# Patient Record
Sex: FEMALE | Race: BLACK OR AFRICAN AMERICAN | Employment: PART TIME | URBAN - METROPOLITAN AREA
[De-identification: names, ages, dates, MRNs, and addresses within clinical notes are randomized per-mention and may not be internally consistent; named-entity substitution may affect disease eponyms.]

---

## 2017-02-23 ENCOUNTER — OFFICE VISIT (OUTPATIENT)
Dept: FAMILY MEDICINE CLINIC | Age: 22
End: 2017-02-23

## 2017-02-23 VITALS
HEART RATE: 81 BPM | DIASTOLIC BLOOD PRESSURE: 70 MMHG | SYSTOLIC BLOOD PRESSURE: 106 MMHG | WEIGHT: 120 LBS | HEIGHT: 63 IN | TEMPERATURE: 98.1 F | BODY MASS INDEX: 21.26 KG/M2 | RESPIRATION RATE: 16 BRPM | OXYGEN SATURATION: 98 %

## 2017-02-23 DIAGNOSIS — J02.9 SORE THROAT: ICD-10-CM

## 2017-02-23 DIAGNOSIS — Z12.4 PAP SMEAR FOR CERVICAL CANCER SCREENING: Primary | ICD-10-CM

## 2017-02-23 NOTE — PATIENT INSTRUCTIONS
Sore Throat: Care Instructions  Your Care Instructions    Infection by bacteria or a virus causes most sore throats. Cigarette smoke, dry air, air pollution, allergies, and yelling can also cause a sore throat. Sore throats can be painful and annoying. Fortunately, most sore throats go away on their own. If you have a bacterial infection, your doctor may prescribe antibiotics. Follow-up care is a key part of your treatment and safety. Be sure to make and go to all appointments, and call your doctor if you are having problems. It's also a good idea to know your test results and keep a list of the medicines you take. How can you care for yourself at home? · If your doctor prescribed antibiotics, take them as directed. Do not stop taking them just because you feel better. You need to take the full course of antibiotics. · Gargle with warm salt water once an hour to help reduce swelling and relieve discomfort. Use 1 teaspoon of salt mixed in 1 cup of warm water. · Take an over-the-counter pain medicine, such as acetaminophen (Tylenol), ibuprofen (Advil, Motrin), or naproxen (Aleve). Read and follow all instructions on the label. · Be careful when taking over-the-counter cold or flu medicines and Tylenol at the same time. Many of these medicines have acetaminophen, which is Tylenol. Read the labels to make sure that you are not taking more than the recommended dose. Too much acetaminophen (Tylenol) can be harmful. · Drink plenty of fluids. Fluids may help soothe an irritated throat. Hot fluids, such as tea or soup, may help decrease throat pain. · Use over-the-counter throat lozenges to soothe pain. Regular cough drops or hard candy may also help. These should not be given to young children because of the risk of choking. · Do not smoke or allow others to smoke around you. If you need help quitting, talk to your doctor about stop-smoking programs and medicines.  These can increase your chances of quitting for good. · Use a vaporizer or humidifier to add moisture to your bedroom. Follow the directions for cleaning the machine. When should you call for help? Call your doctor now or seek immediate medical care if:  · You have new or worse trouble swallowing. · Your sore throat gets much worse on one side. Watch closely for changes in your health, and be sure to contact your doctor if you do not get better as expected. Where can you learn more? Go to http://faustina-gavin.info/. Enter 062 [id number] 80 [id number] in the search box to learn more about \"Sore Throat: Care Instructions. \"  Current as of: July 29, 2016  Content Version: 11.1  © 4894-8099 [employer] Software, [employer]. Care instructions adapted under license by [employer] (which disclaims liability or warranty for this information). If you have questions about a medical condition or this instruction, always ask your healthcare professional. [address] any warranty or liability for your use of this information.

## 2017-02-23 NOTE — MR AVS SNAPSHOT
Visit Information Date & Time Provider Department Dept. Phone Encounter #  
 2/23/2017  2:20 PM Eddi Olivera, [address] [phone] [id number] Upcoming Health Maintenance Date Due  
 HPV AGE 9Y-34Y (1 of 3 - Female 3 Dose Series) 7/23/2005 DTaP/Tdap/Td series (1 - Tdap) 7/23/2015 INFLUENZA AGE 9 TO ADULT 8/1/2016 PAP AKA CERVICAL CYTOLOGY 10/28/2018 Allergies as of 2/23/2017  Review Complete On: 2/23/2017 By: Eddi Olivera MD  
 No Known Allergies Current Immunizations  Never Reviewed No immunizations on file. Not reviewed this visit You Were Diagnosed With   
  
 Codes Comments Sore throat    -  Primary ICD-10-CM: J02.9 ICD-9-CM: 074 Pap smear for cervical cancer screening     ICD-10-CM: Z12.4 ICD-9-CM: V76.2 Vitals BP  
  
  
  
  
  
 106/70 (BP 1 Location: Left arm, BP Patient Position: Sitting) BMI and BSA Data Body Mass Index Body Surface Area  
 21.26 kg/m 2 1.56 m 2 Preferred Pharmacy Pharmacy Name Phone [facility] [address], [address] Your Updated Medication List  
  
Notice  As of 2/23/2017  3:14 PM  
 You have not been prescribed any medications. We Performed the Following PAP, LB, [id number] To-Do List   
 02/23/2017 Microbiology:  THROAT CULTURE Patient Instructions Sore Throat: Care Instructions Your Care Instructions Infection by bacteria or a virus causes most sore throats. Cigarette smoke, dry air, air pollution, allergies, and yelling can also cause a sore throat. Sore throats can be painful and annoying. Fortunately, most sore throats go away on their own. If you have a bacterial infection, your doctor may prescribe antibiotics. Follow-up care is a key part of your treatment and safety.  Be sure to make and go to all appointments, and call your doctor if you are having problems. It's also a good idea to know your test results and keep a list of the medicines you take. How can you care for yourself at home? · If your doctor prescribed antibiotics, take them as directed. Do not stop taking them just because you feel better. You need to take the full course of antibiotics. · Gargle with warm salt water once an hour to help reduce swelling and relieve discomfort. Use 1 teaspoon of salt mixed in 1 cup of warm water. · Take an over-the-counter pain medicine, such as acetaminophen (Tylenol), ibuprofen (Advil, Motrin), or naproxen (Aleve). Read and follow all instructions on the label. · Be careful when taking over-the-counter cold or flu medicines and Tylenol at the same time. Many of these medicines have acetaminophen, which is Tylenol. Read the labels to make sure that you are not taking more than the recommended dose. Too much acetaminophen (Tylenol) can be harmful. · Drink plenty of fluids. Fluids may help soothe an irritated throat. Hot fluids, such as tea or soup, may help decrease throat pain. · Use over-the-counter throat lozenges to soothe pain. Regular cough drops or hard candy may also help. These should not be given to young children because of the risk of choking. · Do not smoke or allow others to smoke around you. If you need help quitting, talk to your doctor about stop-smoking programs and medicines. These can increase your chances of quitting for good. · Use a vaporizer or humidifier to add moisture to your bedroom. Follow the directions for cleaning the machine. When should you call for help? Call your doctor now or seek immediate medical care if: 
· You have new or worse trouble swallowing. · Your sore throat gets much worse on one side. Watch closely for changes in your health, and be sure to contact your doctor if you do not get better as expected. Where can you learn more? Go to http://faustina-gavin.info/. Enter [id number] in the search box to learn more about \"Sore Throat: Care Instructions. \" Current as of: July 29, 2016 Content Version: 11.1 © 5775-3294 Healthwise, Incorporated. Care instructions adapted under license by [employer] (which disclaims liability or warranty for this information). If you have questions about a medical condition or this instruction, always ask your healthcare professional. [address] any warranty or liability for your use of this information. Introducing [facility] & HEALTH SERVICES! Leon Bland introduces [employer] patient portal. Now you can access parts of your medical record, email your doctor's office, and request medication refills online. 1. In your internet browser, go to [email or web address] 2. Click on the First Time User? Click Here link in the Sign In box. You will see the New Member Sign Up page. 3. Enter your Netero Access Code exactly as it appears below. You will not need to use this code after youve completed the sign-up process. If you do not sign up before the expiration date, you must request a new code. · Netero Access Code: [email or web address] Expires: 5/24/2017  3:14 PM 
 
4. Enter the last four digits of your Social Security Number (xxxx) and Date of Birth (mm/dd/yyyy) as indicated and click Submit. You will be taken to the next sign-up page. 5. Create a Netero ID. This will be your Netero login ID and cannot be changed, so think of one that is secure and easy to remember. 6. Create a Netero password. You can change your password at any time. 7. Enter your Password Reset Question and Answer. This can be used at a later time if you forget your password. 8. Enter your e-mail address. You will receive e-mail notification when new information is available in [address] 9. Click Sign Up. You can now view and download portions of your medical record. 10. Click the Download Summary menu link to download a portable copy of your medical information. If you have questions, please visit the Frequently Asked Questions section of the [employer] website. Remember, [employer] is NOT to be used for urgent needs. For medical emergencies, dial 911. Now available from your iPhone and Android! Please provide this summary of care documentation to your next provider. Your primary care clinician is listed as [address] [facility]. If you have any questions after today's visit, please call [phone].

## 2017-02-23 NOTE — PROGRESS NOTES
1. Have you been to the ER, urgent care clinic since your last visit? Hospitalized since your last visit? No    2. Have you seen or consulted any other health care providers outside of the [address] [place] [address] since your last visit? Include any pap smears or colon screening.  No

## 2017-02-24 NOTE — PROGRESS NOTES
HISTORY OF PRESENT ILLNESS  Александр Chu is a 24 y.o. female. HPI  Patient is here today for evaluation and treatment of: Gyn Exam / Sore Throat    Gyn Exam: her for pap and breast exam. She has just started her menses. Sore Throat: states she has had  Some hoarseness and throat feels sore on the left hand side and it is hard to swallow. She is concerned about possible STI;     She has seen Psychiatry and has been diagnosed with some further psychiatric illnessess. She is not on meds ; She has been advised to follow back up with Psych to get her meds. PMH,  Meds, Allergies, Family History, Social history reviewed      No current outpatient prescriptions on file. Review of Systems   Constitutional: Negative for fever. Respiratory: Positive for cough. Gastrointestinal: Positive for vomiting. Physical Exam  Visit Vitals    /70 (BP 1 Location: Left arm, BP Patient Position: Sitting)    Pulse 81    Temp 98.1 °F (36.7 °C) (Oral)    Resp 16    Ht 5' 3\" (1.6 m)    Wt 120 lb (54.4 kg)    LMP 02/19/2017    SpO2 98%    BMI 21.26 kg/m2     General appearance: alert, cooperative, no distress, appears stated age  Neck: supple, symmetrical, trachea midline, no adenopathy, thyroid: not enlarged, symmetric, no tenderness/mass/nodules, oropharynx is nonerythematous;  Lungs: clear to auscultation bilaterally  Heart: regular rate and rhythm, S1, S2 normal, no murmur, click, rub or gallop  Extremities: extremities normal, atraumatic, no cyanosis or edema  Pelvic exam: VULVA: normal appearing vulva with no masses, tenderness or lesions, VAGINA: normal appearing vagina with normal color and discharge, no lesions, CERVIX: normal appearing cervix without discharge or lesions, UTERUS: uterus is normal size, shape, consistency and nontender, ADNEXA: normal adnexa in size, nontender and no masses. ASSESSMENT and PLAN    ICD-10-CM ICD-9-CM    1. Sore throat J02.9 462 THROAT CULTURE   2.  Pap smear for cervical cancer screening Z12.4 V76.2 PAP, LB, RFX HPV [id number])       As above,    treatment plan as listed below  Orders Placed This Encounter    THROAT CULTURE    PAP, LB, RFX HPV PGNTV([id number])     Pt to see psych  Follow-up Disposition:  Return if symptoms worsen or fail to improve. An After Visit Summary was printed and given to the patient. This has been fully explained to the patient, who indicates understanding.

## 2017-02-27 LAB — BACTERIA SPEC RESP CULT: NORMAL

## 2017-02-28 LAB
CYTOLOGIST CVX/VAG CYTO: NORMAL
DX ICD CODE: NORMAL
LABCORP, 190119: NORMAL
Lab: NORMAL
OTHER STN SPEC: NORMAL
PATH REPORT.FINAL DX SPEC: NORMAL
STAT OF ADQ CVX/VAG CYTO-IMP: NORMAL

## 2017-05-08 ENCOUNTER — TELEPHONE (OUTPATIENT)
Dept: FAMILY MEDICINE CLINIC | Age: 22
End: 2017-05-08

## 2017-05-18 ENCOUNTER — OFFICE VISIT (OUTPATIENT)
Dept: FAMILY MEDICINE CLINIC | Age: 22
End: 2017-05-18

## 2017-05-18 VITALS
DIASTOLIC BLOOD PRESSURE: 64 MMHG | SYSTOLIC BLOOD PRESSURE: 106 MMHG | HEIGHT: 63 IN | TEMPERATURE: 98.2 F | OXYGEN SATURATION: 98 % | RESPIRATION RATE: 16 BRPM | HEART RATE: 96 BPM | BODY MASS INDEX: 20.02 KG/M2 | WEIGHT: 113 LBS

## 2017-05-18 DIAGNOSIS — N89.8 VAGINAL ODOR: Primary | ICD-10-CM

## 2017-05-18 DIAGNOSIS — N92.0 MENORRHAGIA WITH REGULAR CYCLE: ICD-10-CM

## 2017-05-18 DIAGNOSIS — F31.81 BIPOLAR 2 DISORDER (HCC): ICD-10-CM

## 2017-05-18 LAB
HCG URINE, QL. (POC): NEGATIVE
VALID INTERNAL CONTROL?: YES
WET MOUNT POCT, WMPOCT: NORMAL

## 2017-05-18 NOTE — PROGRESS NOTES
1. Have you been to the ER, urgent care clinic since your last visit? Hospitalized since your last visit? Yes Where: [facility]    2. Have you seen or consulted any other health care providers outside of the [address] [place] [address] since your last visit? Include any pap smears or colon screening.  No

## 2017-05-18 NOTE — MR AVS SNAPSHOT
Visit Information Date & Time Provider Department Dept. Phone Encounter #  
 5/18/2017  5:20 PM Stu Ratliff, [address]  Upcoming Health Maintenance Date Due  
 HPV AGE 9Y-34Y (1 of 3 - Female 3 Dose Series) 7/23/2005 DTaP/Tdap/Td series (1 - Tdap) 7/23/2015 INFLUENZA AGE 9 TO ADULT 8/1/2017 PAP AKA CERVICAL CYTOLOGY 2/24/2020 Allergies as of 5/18/2017  Review Complete On: 5/18/2017 By: Funmilayo Rushing LPN No Known Allergies Current Immunizations  Never Reviewed No immunizations on file. Not reviewed this visit You Were Diagnosed With   
  
 Codes Comments Vaginal odor    -  Primary ICD-10-CM: U18.5 ICD-9-CM: 625.8 Bipolar 2 disorder (HCC)     ICD-10-CM: F31.81 
ICD-9-CM: 296.89 Vitals BP Pulse Temp Resp Height(growth percentile) Weight(growth percentile) 106/64 (BP 1 Location: Right arm, BP Patient Position: Sitting) 96 98.2 °F (36.8 °C) (Oral) 16 5' 3\" (1.6 m) 113 lb (51.3 kg) SpO2 BMI OB Status Smoking Status 98% 20.02 kg/m2 Needs review Current Every Day Smoker BMI and BSA Data Body Mass Index Body Surface Area 20.02 kg/m 2 1.51 m 2 Preferred Pharmacy Pharmacy Name Phone [id number] Zhou, [address] [place] [address] Your Updated Medication List  
  
Notice  As of 5/18/2017  6:20 PM  
 You have not been prescribed any medications. We Performed the Following REFERRAL TO PSYCHIATRY [REF91 Custom] Comments:  
 Please evaluate patient for bipolar 1,2 schizophrenia. Referral Information Referral ID Referred By Referred To  
  
 [id number] Mckay Worley MD   
   [address] [place] [address] Rd   
   Zara Zhou, [address] Phone: [phone] Fax: [phone] Visits Status Start Date End Date 1 New Request 5/18/17 5/18/18 If your referral has a status of pending review or denied, additional information will be sent to support the outcome of this decision. Patient Instructions Shot for [employer] Control: Care Instructions Your Care Instructions The shot is used to prevent pregnancy. You get the shot in your upper arm or rear end (buttocks). The shot gives you a dose of the hormone progestin. The shot is often called by its brand name, Depo-Provera. The shot provides birth control for 3 months at a time. You then need another shot. Follow-up care is a key part of your treatment and safety. Be sure to make and go to all appointments, and call your doctor if you are having problems. It's also a good idea to know your test results and keep a list of the medicines you take. How can you care for yourself at home? How do you use the birth control shot? · If you get the shot during the first 5 days of your normal period, use backup birth control, such as a condom, or don't have intercourse for 24 hours. · If you get the shot more than 5 days after your periods starts, use backup birth control or don't have intercourse for 5 days. · Talk to your doctor about a schedule to get the shot. You need the shot every 3 months. If you are late getting it, you'll need backup birth control. What if you miss or are late for a shot? Always read the label for specific instructions, or call your doctor. Here are some basic guidelines: · Use backup birth control, such as a condom, or don't have intercourse. Continue using one of these methods until 5 days after you get the missed or late shot. · If you had intercourse, you can use emergency contraception, such as the morning-after pill (Plan B). You can use emergency contraception for up to 5 days after having had sex, but it works best if you take it right away. What else do you need to know? · The shot has side effects.  Because the shot protects for 3 months, the side effects may last 3 months. ¨ You may have changes in your period and your period may stop. You may also have spotting or bleeding between periods. ¨ You may have mood changes, less interest in sex, or weight gain. · The shot may cause bone loss. Talk to your doctor about this and take steps to prevent bone loss, such as getting enough calcium in your diet and exercising regularly. · Check with your doctor before you use any other medicines, including over-the-counter medicines, vitamins, herbal products, and supplements. Birth control hormones may not work as well to prevent pregnancy when combined with other medicines. · The shot doesn't protect against sexually transmitted infections (STIs), such as herpes or HIV/AIDS. If you're not sure whether your sex partner might have an STI, use a condom to protect against infection. When should you call for help? Call your doctor now or seek immediate medical care if: 
· You have severe belly pain. Watch closely for changes in your health, and be sure to contact your doctor if: 
· You think you may be pregnant. · You have any problems with your birth control. · You feel you may be depressed. · You regularly have spotting. · You think you may have been exposed to or have a sexually transmitted infection. Where can you learn more? Go to http://faustina-gavin.info/. Enter I515 in the search box to learn more about \"Shot for Birth Control: Care Instructions. \" Current as of: May 30, 2016 Content Version: 11.2 © 2263-4360 [employer]. Care instructions adapted under license by [employer] (which disclaims liability or warranty for this information). If you have questions about a medical condition or this instruction, always ask your healthcare professional. [place] [address] any warranty or liability for your use of this information. Introducing [facility] & HEALTH SERVICES! Elizabeth Guzman introduces [employer] patient portal. Now you can access parts of your medical record, email your doctor's office, and request medication refills online. 1. In your internet browser, go to [email or web address] 2. Click on the First Time User? Click Here link in the Sign In box. You will see the New Member Sign Up page. 3. Enter your [employer] Access Code exactly as it appears below. You will not need to use this code after youve completed the sign-up process. If you do not sign up before the expiration date, you must request a new code. · [employer] Access Code: [email or web address] Expires: 5/24/2017  4:14 PM 
 
4. Enter the last four digits of your Social Security Number (xxxx) and Date of Birth (mm/dd/yyyy) as indicated and click Submit. You will be taken to the next sign-up page. 5. Create a [employer] ID. This will be your [employer] login ID and cannot be changed, so think of one that is secure and easy to remember. 6. Create a [employer] password. You can change your password at any time. 7. Enter your Password Reset Question and Answer. This can be used at a later time if you forget your password. 8. Enter your e-mail address. You will receive e-mail notification when new information is available in [address] 9. Click Sign Up. You can now view and download portions of your medical record. 10. Click the Download Summary menu link to download a portable copy of your medical information. If you have questions, please visit the Frequently Asked Questions section of the [employer] website. Remember, [employer] is NOT to be used for urgent needs. For medical emergencies, dial 911. Now available from your iPhone and Android! Please provide this summary of care documentation to your next provider. Your primary care clinician is listed as [address]. If you have any questions after today's visit, please call [phone].

## 2017-05-18 NOTE — PROGRESS NOTES
HISTORY OF PRESENT ILLNESS  Mayur Azar is a 24 y.o. female. HPI  Patient is here today for evaluation and treatment of: Vaginal Odor    Vaginal Odor:   Pt states that early March she started her period and bled until mid April. She had a regular period in January and then in February. Her period stopped a few days ago. She now has a vaginal odor. She does have a vaginal discharge. Thick in consistency and yellow in color. She has not yet been to Psychiatry for follow up on PTSD, bipolar 2    PMH,  Meds, Allergies, Family History, Social history reviewed      No current outpatient prescriptions on file. Review of Systems   Constitutional: Negative for chills and fever. Gastrointestinal: Negative for abdominal pain. Physical Exam   Constitutional: She appears well-developed and well-nourished. No distress. Cardiovascular: Exam reveals no friction rub. Pulmonary/Chest: Breath sounds normal. No respiratory distress. She has no wheezes. She has no rales. Genitourinary: Vagina normal and uterus normal. No vaginal discharge found. Nursing note and vitals reviewed. Urine preg neg  ASSESSMENT and PLAN    ICD-10-CM ICD-9-CM    1. Vaginal odor N89.8 625.8 NUSWAB VAGINITIS PLUS      AMB POC SMEAR, STAIN & INTERPRET, WET MOUNT   2. Bipolar 2 disorder (HCC) F31.81 296.89 REFERRAL TO PSYCHIATRY   3. Menorrhagia with regular cycle N92.0 626.2 AMB POC URINE PREGNANCY TEST, VISUAL COLOR COMPARISON       As above, new  Orders Placed This Encounter    NUSWAB VAGINITIS PLUS    REFERRAL TO PSYCHIATRY    AMB POC URINE PREGNANCY TEST, VISUAL COLOR COMPARISON    AMB POC SMEAR, STAIN & INTERPRET, WET MOUNT     Follow-up Disposition:  Return if symptoms worsen or fail to improve. An After Visit Summary was printed and given to the patient. This has been fully explained to the patient, who indicates understanding.

## 2017-05-18 NOTE — PATIENT INSTRUCTIONS
Shot for [employer] Control: Care Instructions  Your Care Instructions  The shot is used to prevent pregnancy. You get the shot in your upper arm or rear end (buttocks). The shot gives you a dose of the hormone progestin. The shot is often called by its brand name, Depo-Provera. The shot provides birth control for 3 months at a time. You then need another shot. Follow-up care is a key part of your treatment and safety. Be sure to make and go to all appointments, and call your doctor if you are having problems. It's also a good idea to know your test results and keep a list of the medicines you take. How can you care for yourself at home? How do you use the birth control shot? · If you get the shot during the first 5 days of your normal period, use backup birth control, such as a condom, or don't have intercourse for 24 hours. · If you get the shot more than 5 days after your periods starts, use backup birth control or don't have intercourse for 5 days. · Talk to your doctor about a schedule to get the shot. You need the shot every 3 months. If you are late getting it, you'll need backup birth control. What if you miss or are late for a shot? Always read the label for specific instructions, or call your doctor. Here are some basic guidelines:  · Use backup birth control, such as a condom, or don't have intercourse. Continue using one of these methods until 5 days after you get the missed or late shot. · If you had intercourse, you can use emergency contraception, such as the morning-after pill (Plan B). You can use emergency contraception for up to 5 days after having had sex, but it works best if you take it right away. What else do you need to know? · The shot has side effects. Because the shot protects for 3 months, the side effects may last 3 months. ¨ You may have changes in your period and your period may stop. You may also have spotting or bleeding between periods.   ¨ You may have mood changes, less interest in sex, or weight gain. · The shot may cause bone loss. Talk to your doctor about this and take steps to prevent bone loss, such as getting enough calcium in your diet and exercising regularly. · Check with your doctor before you use any other medicines, including over-the-counter medicines, vitamins, herbal products, and supplements. Birth control hormones may not work as well to prevent pregnancy when combined with other medicines. · The shot doesn't protect against sexually transmitted infections (STIs), such as herpes or HIV/AIDS. If you're not sure whether your sex partner might have an STI, use a condom to protect against infection. When should you call for help? Call your doctor now or seek immediate medical care if:  · You have severe belly pain. Watch closely for changes in your health, and be sure to contact your doctor if:  · You think you may be pregnant. · You have any problems with your birth control. · You feel you may be depressed. · You regularly have spotting. · You think you may have been exposed to or have a sexually transmitted infection. Where can you learn more? Go to http://faustina-gavin.info/. Enter K710 in the search box to learn more about \"Shot for Birth Control: Care Instructions. \"  Current as of: May 30, 2016  Content Version: 11.2  © 1915-4468 [employer]. Care instructions adapted under license by [employer] (which disclaims liability or warranty for this information). If you have questions about a medical condition or this instruction, always ask your healthcare professional. [place] [address] any warranty or liability for your use of this information.

## 2017-05-24 LAB
A VAGINAE DNA VAG QL NAA+PROBE: ABNORMAL SCORE
BVAB2 DNA VAG QL NAA+PROBE: ABNORMAL SCORE
C ALBICANS DNA VAG QL NAA+PROBE: NEGATIVE
C GLABRATA DNA VAG QL NAA+PROBE: NEGATIVE
C TRACH RRNA SPEC QL NAA+PROBE: NEGATIVE
MEGA1 DNA VAG QL NAA+PROBE: ABNORMAL SCORE
N GONORRHOEA RRNA SPEC QL NAA+PROBE: NEGATIVE
PLEASE NOTE:, 188601: NORMAL
T VAGINALIS RRNA SPEC QL NAA+PROBE: NEGATIVE

## 2017-05-30 RX ORDER — METRONIDAZOLE 500 MG/1
500 TABLET ORAL 2 TIMES DAILY
Qty: 14 TAB | Refills: 0 | Status: SHIPPED | OUTPATIENT
Start: 2017-05-30 | End: 2017-06-06

## 2017-06-22 ENCOUNTER — OFFICE VISIT (OUTPATIENT)
Dept: FAMILY MEDICINE CLINIC | Age: 22
End: 2017-06-22

## 2017-06-22 VITALS
WEIGHT: 118 LBS | OXYGEN SATURATION: 98 % | TEMPERATURE: 98.1 F | SYSTOLIC BLOOD PRESSURE: 100 MMHG | DIASTOLIC BLOOD PRESSURE: 60 MMHG | BODY MASS INDEX: 20.91 KG/M2 | RESPIRATION RATE: 16 BRPM | HEART RATE: 81 BPM | HEIGHT: 63 IN

## 2017-06-22 DIAGNOSIS — Z30.09 FAMILY PLANNING: Primary | ICD-10-CM

## 2017-06-22 RX ORDER — DEXTROAMPHETAMINE SACCHARATE, AMPHETAMINE ASPARTATE, DEXTROAMPHETAMINE SULFATE AND AMPHETAMINE SULFATE 2.5; 2.5; 2.5; 2.5 MG/1; MG/1; MG/1; MG/1
10 TABLET ORAL
COMMUNITY
End: 2022-07-16

## 2017-06-22 RX ORDER — OXCARBAZEPINE 150 MG/1
150 TABLET, FILM COATED ORAL 3 TIMES DAILY
COMMUNITY
End: 2022-10-12

## 2017-06-22 RX ORDER — TRAZODONE HYDROCHLORIDE 50 MG/1
TABLET ORAL
COMMUNITY
End: 2022-10-12

## 2017-06-22 RX ORDER — MEDROXYPROGESTERONE ACETATE 150 MG/ML
150 INJECTION, SUSPENSION INTRAMUSCULAR ONCE
Qty: 1 ML | Refills: 0 | Status: SHIPPED | OUTPATIENT
Start: 2017-06-22 | End: 2017-10-13 | Stop reason: SDUPTHER

## 2017-06-22 NOTE — MR AVS SNAPSHOT
Visit Information Date & Time Provider Department Dept. Phone Encounter #  
 6/22/2017  2:40 PM Stevenson Wiseman, [address] [id number] Follow-up Instructions Return if symptoms worsen or fail to improve. Upcoming Health Maintenance Date Due  
 HPV AGE 9Y-34Y (1 of 3 - Female 3 Dose Series) 7/23/2005 Pneumococcal 19-64 Medium Risk (1 of 1 - PPSV23) 7/23/2013 DTaP/Tdap/Td series (1 - Tdap) 7/23/2015 INFLUENZA AGE 9 TO ADULT 8/1/2017 PAP AKA CERVICAL CYTOLOGY 2/24/2020 Allergies as of 6/22/2017  Review Complete On: 6/22/2017 By: Stevenson Wiseman MD  
 No Known Allergies Current Immunizations  Never Reviewed No immunizations on file. Not reviewed this visit You Were Diagnosed With   
  
 Codes Comments Family planning    -  Primary ICD-10-CM: Z30.09 
ICD-9-CM: V25.09 Vitals BP Pulse Temp Resp Height(growth percentile) Weight(growth percentile) 100/60 (BP 1 Location: Left arm, BP Patient Position: Sitting) 81 98.1 °F (36.7 °C) (Oral) 16 5' 3\" (1.6 m) 118 lb (53.5 kg) LMP SpO2 BMI OB Status Smoking Status (LMP Unknown) 98% 20.9 kg/m2 Needs review Current Every Day Smoker BMI and BSA Data Body Mass Index Body Surface Area  
 20.9 kg/m 2 1.54 m 2 Preferred Pharmacy Pharmacy Name Phone [id number] [place] [address] Your Updated Medication List  
  
   
This list is accurate as of: 6/22/17  3:15 PM.  Always use your most recent med list.  
  
  
  
  
 ADDERALL 10 mg tablet Generic drug:  dextroamphetamine-amphetamine Take 10 mg by mouth.  
  
 medroxyPROGESTERone 150 mg/mL injection Commonly known as:  DEPO-PROVERA  
1 mL by IntraMUSCular route once for 1 dose. Repeat in 3 months. To be administered by nursing. traZODone 50 mg tablet Commonly known as:  Josef Owens Take  by mouth nightly. TRILEPTAL 150 mg tablet Generic drug:  OXcarbazepine Take 150 mg by mouth three (3) times daily. Prescriptions Sent to Pharmacy Refills  
 medroxyPROGESTERone (DEPO-PROVERA) 150 mg/mL injection 0 Si mL by IntraMUSCular route once for 1 dose. Repeat in 3 months. To be administered by nursing. Class: Normal  
 Pharmacy: [id number]- Shweta Steen, [address] [place] [address] #: [phone] Route: IntraMUSCular Follow-up Instructions Return if symptoms worsen or fail to improve. Patient Instructions Shot for [employer] Control: Care Instructions Your Care Instructions The shot is used to prevent pregnancy. You get the shot in your upper arm or rear end (buttocks). The shot gives you a dose of the hormone progestin. The shot is often called by its brand name, Depo-Provera. The shot provides birth control for 3 months at a time. You then need another shot. Follow-up care is a key part of your treatment and safety. Be sure to make and go to all appointments, and call your doctor if you are having problems. It's also a good idea to know your test results and keep a list of the medicines you take. How can you care for yourself at home? How do you use the birth control shot? · If you get the shot during the first 5 days of your normal period, use backup birth control, such as a condom, or don't have intercourse for 24 hours. · If you get the shot more than 5 days after your periods starts, use backup birth control or don't have intercourse for 5 days. · Talk to your doctor about a schedule to get the shot. You need the shot every 3 months. If you are late getting it, you'll need backup birth control. What if you miss or are late for a shot? Always read the label for specific instructions, or call your doctor. Here are some basic guidelines: · Use backup birth control, such as a condom, or don't have intercourse. Continue using one of these methods until 5 days after you get the missed or late shot. · If you had intercourse, you can use emergency contraception, such as the morning-after pill (Plan B). You can use emergency contraception for up to 5 days after having had sex, but it works best if you take it right away. What else do you need to know? · The shot has side effects. Because the shot protects for 3 months, the side effects may last 3 months. ¨ You may have changes in your period and your period may stop. You may also have spotting or bleeding between periods. ¨ You may have mood changes, less interest in sex, or weight gain. · The shot may cause bone loss. Talk to your doctor about this and take steps to prevent bone loss, such as getting enough calcium in your diet and exercising regularly. · Check with your doctor before you use any other medicines, including over-the-counter medicines, vitamins, herbal products, and supplements. Birth control hormones may not work as well to prevent pregnancy when combined with other medicines. · The shot doesn't protect against sexually transmitted infections (STIs), such as herpes or HIV/AIDS. If you're not sure whether your sex partner might have an STI, use a condom to protect against infection. When should you call for help? Call your doctor now or seek immediate medical care if: 
· You have severe belly pain. Watch closely for changes in your health, and be sure to contact your doctor if: 
· You think you may be pregnant. · You have any problems with your birth control. · You feel you may be depressed. · You regularly have spotting. · You think you may have been exposed to or have a sexually transmitted infection. Where can you learn more? Go to http://faustina-gavin.info/. Enter O405 in the search box to learn more about \"Shot for Birth Control: Care Instructions. \" Current as of: March 16, 2017 Content Version: 11.3 © 7259-6721 [employer] Incorporated. Care instructions adapted under license by [employer] (which disclaims liability or warranty for this information). If you have questions about a medical condition or this instruction, always ask your healthcare professional. [address] any warranty or liability for your use of this information. Introducing [facility]! Neisha Nix introduces [employer]Tree patient portal. Now you can access parts of your medical record, email your doctor's office, and request medication refills online. 1. In your internet browser, go to https://[employer]. [employer] 2. Click on the First Time User? Click Here link in the Sign In box. You will see the New Member Sign Up page. 3. Enter your LogicTree Access Code exactly as it appears below. You will not need to use this code after youve completed the sign-up process. If you do not sign up before the expiration date, you must request a new code. · LogicTree Access Code: [email or web address] Expires: 9/20/2017  3:15 PM 
 
4. Enter the last four digits of your Social Security Number (xxxx) and Date of Birth (mm/dd/yyyy) as indicated and click Submit. You will be taken to the next sign-up page. 5. Create a LogicTree ID. This will be your LogicTree login ID and cannot be changed, so think of one that is secure and easy to remember. 6. Create a LogicTree password. You can change your password at any time. 7. Enter your Password Reset Question and Answer. This can be used at a later time if you forget your password. 8. Enter your e-mail address. You will receive e-mail notification when new information is available in [address] 9. Click Sign Up. You can now view and download portions of your medical record. 10. Click the Download Summary menu link to download a portable copy of your medical information.  
 
If you have questions, please visit the Frequently Asked Questions section of the [employer]. Remember, [employer] is NOT to be used for urgent needs. For medical emergencies, dial 911. Now available from your iPhone and Android! Please provide this summary of care documentation to your next provider. Your primary care clinician is listed as [address]. If you have any questions after today's visit, please call [phone].

## 2017-06-22 NOTE — PROGRESS NOTES
HISTORY OF PRESENT ILLNESS  Radha Teran is a 24 y.o. female. HPI  Patient is here today for evaluation and treatment of: Family Planning   Family  Planning. Pt is interested in depo provera. LMP was last week. She has never been on Depo. She has no new complaints      Current Outpatient Prescriptions:     dextroamphetamine-amphetamine (ADDERALL) 10 mg tablet, Take 10 mg by mouth., Disp: , Rfl:     OXcarbazepine (TRILEPTAL) 150 mg tablet, Take 150 mg by mouth three (3) times daily. , Disp: , Rfl:     traZODone (DESYREL) 50 mg tablet, Take  by mouth nightly., Disp: , Rfl:       Review of Systems   Constitutional: Negative for fever. Cardiovascular: Negative for chest pain and palpitations. PMH,  Meds, Allergies, Family History, Social history reviewed    Physical Exam   Constitutional: She appears well-developed and well-nourished. Cardiovascular: Normal rate and regular rhythm. Exam reveals no gallop and no friction rub. No murmur heard. Pulmonary/Chest: Breath sounds normal. No respiratory distress. She has no wheezes. She has no rales. Musculoskeletal: She exhibits no edema. Nursing note and vitals reviewed. ASSESSMENT and PLAN    ICD-10-CM ICD-9-CM    1. Family planning Z30.09 V25.09        As above, new  Orders Placed This Encounter    dextroamphetamine-amphetamine (ADDERALL) 10 mg tablet    OXcarbazepine (TRILEPTAL) 150 mg tablet    traZODone (DESYREL) 50 mg tablet    medroxyPROGESTERone (DEPO-PROVERA) 150 mg/mL injection     Pt to return to the office within 5 days of starting her next period to have depo administered by nursing  Follow-up Disposition:  Return if symptoms worsen or fail to improve. An After Visit Summary was printed and given to the patient. This has been fully explained to the patient, who indicates understanding.

## 2017-07-25 ENCOUNTER — CLINICAL SUPPORT (OUTPATIENT)
Dept: FAMILY MEDICINE CLINIC | Age: 22
End: 2017-07-25

## 2017-07-25 DIAGNOSIS — Z30.42 ENCOUNTER FOR DEPO-PROVERA CONTRACEPTION: Primary | ICD-10-CM

## 2017-07-25 LAB
HCG URINE, QL. (POC): NEGATIVE
VALID INTERNAL CONTROL?: YES

## 2017-07-25 RX ORDER — METHYLPREDNISOLONE ACETATE 80 MG/ML
80 INJECTION, SUSPENSION INTRA-ARTICULAR; INTRALESIONAL; INTRAMUSCULAR; SOFT TISSUE ONCE
Qty: 1 VIAL | Refills: 0 | Status: SHIPPED | COMMUNITY
Start: 2017-07-25 | End: 2017-07-25

## 2017-07-25 NOTE — PROGRESS NOTES
Patient presented to office today for depo injection. Patient consents to receiving injection. No adverse effects noted to previous injection. Patient is feeling well, denies any issues at this time. Patient pregnancy test: Negative  Patient received 1 ml of depo to Right Gluteus . Patient tolerated procedure well. Advised patient she will need to return to office no sooner than Oct 10, 2017 and no later than Oct 24, 2017 for next injection. Patient verbalized understanding. Patient received depo calendar with date circled. Patient left ambulatory with no complaints of pain or distress noted at this time. Subjective:      Charles Sunshine is here for her depoprovera injection. Patient wishes to continue depoprovera treatment for contraception. Side effects of treatment to date: none. Standing order is on patient's medication list.    Last Depoprovera injection date: This is first time  Last Pap smear date: 2/23/2017    Objective: There were no vitals taken for this visit. Assessment/Plan:     Stable, doing well on Depoprovera, appropriate to continue. Depoprovera 150 mg IM given. She tolerated the injection well, see Immunization activity for details.     Delilah Singh LPN

## 2017-09-08 ENCOUNTER — HOSPITAL ENCOUNTER (EMERGENCY)
Age: 22
Discharge: HOME | End: 2017-09-08
Attending: EMERGENCY MEDICINE
Payer: MEDICAID

## 2017-09-08 VITALS
HEART RATE: 69 BPM | DIASTOLIC BLOOD PRESSURE: 75 MMHG | SYSTOLIC BLOOD PRESSURE: 114 MMHG | WEIGHT: 115 LBS | TEMPERATURE: 98.5 F | OXYGEN SATURATION: 98 % | RESPIRATION RATE: 16 BRPM | BODY MASS INDEX: 20.37 KG/M2

## 2017-09-08 DIAGNOSIS — R10.13 ABDOMINAL PAIN, EPIGASTRIC: Primary | ICD-10-CM

## 2017-09-08 DIAGNOSIS — R11.2 NON-INTRACTABLE VOMITING WITH NAUSEA, UNSPECIFIED VOMITING TYPE: ICD-10-CM

## 2017-09-08 DIAGNOSIS — F14.10 COCAINE ABUSE (HCC): ICD-10-CM

## 2017-09-08 DIAGNOSIS — R19.7 DIARRHEA, UNSPECIFIED TYPE: ICD-10-CM

## 2017-09-08 LAB
ALBUMIN SERPL-MCNC: 3.3 G/DL (ref 3.4–5)
ALBUMIN/GLOB SERPL: 1 {RATIO} (ref 0.8–1.7)
ALP SERPL-CCNC: 49 U/L (ref 45–117)
ALT SERPL-CCNC: 17 U/L (ref 13–56)
AMPHET UR QL SCN: NEGATIVE
ANION GAP SERPL CALC-SCNC: 6 MMOL/L (ref 3–18)
APPEARANCE UR: CLEAR
AST SERPL-CCNC: 19 U/L (ref 15–37)
ATRIAL RATE: 48 BPM
BARBITURATES UR QL SCN: NEGATIVE
BASOPHILS # BLD: 0 K/UL (ref 0–0.1)
BASOPHILS NFR BLD: 0 % (ref 0–2)
BENZODIAZ UR QL: NEGATIVE
BILIRUB DIRECT SERPL-MCNC: 0.2 MG/DL (ref 0–0.2)
BILIRUB SERPL-MCNC: 1.1 MG/DL (ref 0.2–1)
BILIRUB UR QL: NEGATIVE
BUN SERPL-MCNC: 9 MG/DL (ref 7–18)
BUN/CREAT SERPL: 9 (ref 12–20)
CALCIUM SERPL-MCNC: 8 MG/DL (ref 8.5–10.1)
CALCULATED P AXIS, ECG09: 73 DEGREES
CALCULATED R AXIS, ECG10: 63 DEGREES
CALCULATED T AXIS, ECG11: 60 DEGREES
CANNABINOIDS UR QL SCN: POSITIVE
CHLORIDE SERPL-SCNC: 108 MMOL/L (ref 100–108)
CK MB CFR SERPL CALC: 0.5 % (ref 0–4)
CK MB SERPL-MCNC: 1.1 NG/ML (ref 5–25)
CK SERPL-CCNC: 206 U/L (ref 26–192)
CO2 SERPL-SCNC: 26 MMOL/L (ref 21–32)
COCAINE UR QL SCN: POSITIVE
COLOR UR: YELLOW
CREAT SERPL-MCNC: 0.96 MG/DL (ref 0.6–1.3)
DIAGNOSIS, 93000: NORMAL
DIFFERENTIAL METHOD BLD: ABNORMAL
EOSINOPHIL # BLD: 0.3 K/UL (ref 0–0.4)
EOSINOPHIL NFR BLD: 5 % (ref 0–5)
ERYTHROCYTE [DISTWIDTH] IN BLOOD BY AUTOMATED COUNT: 14.9 % (ref 11.6–14.5)
GLOBULIN SER CALC-MCNC: 3.3 G/DL (ref 2–4)
GLUCOSE SERPL-MCNC: 71 MG/DL (ref 74–99)
GLUCOSE UR STRIP.AUTO-MCNC: NEGATIVE MG/DL
HCG UR QL: NEGATIVE
HCT VFR BLD AUTO: 39.4 % (ref 35–45)
HDSCOM,HDSCOM: ABNORMAL
HGB BLD-MCNC: 13.3 G/DL (ref 12–16)
HGB UR QL STRIP: NEGATIVE
KETONES UR QL STRIP.AUTO: NEGATIVE MG/DL
LEUKOCYTE ESTERASE UR QL STRIP.AUTO: NEGATIVE
LIPASE SERPL-CCNC: 114 U/L (ref 73–393)
LYMPHOCYTES # BLD: 2.3 K/UL (ref 0.9–3.6)
LYMPHOCYTES NFR BLD: 39 % (ref 21–52)
MAGNESIUM SERPL-MCNC: 2.6 MG/DL (ref 1.6–2.6)
MCH RBC QN AUTO: 28.2 PG (ref 24–34)
MCHC RBC AUTO-ENTMCNC: 33.8 G/DL (ref 31–37)
MCV RBC AUTO: 83.5 FL (ref 74–97)
METHADONE UR QL: NEGATIVE
MONOCYTES # BLD: 0.5 K/UL (ref 0.05–1.2)
MONOCYTES NFR BLD: 8 % (ref 3–10)
NEUTS SEG # BLD: 2.8 K/UL (ref 1.8–8)
NEUTS SEG NFR BLD: 48 % (ref 40–73)
NITRITE UR QL STRIP.AUTO: NEGATIVE
OPIATES UR QL: NEGATIVE
P-R INTERVAL, ECG05: 158 MS
PCP UR QL: NEGATIVE
PH UR STRIP: 5.5 [PH] (ref 5–8)
PLATELET # BLD AUTO: 282 K/UL (ref 135–420)
PMV BLD AUTO: 10.6 FL (ref 9.2–11.8)
POTASSIUM SERPL-SCNC: 3.9 MMOL/L (ref 3.5–5.5)
PROT SERPL-MCNC: 6.6 G/DL (ref 6.4–8.2)
PROT UR STRIP-MCNC: NEGATIVE MG/DL
Q-T INTERVAL, ECG07: 456 MS
QRS DURATION, ECG06: 82 MS
QTC CALCULATION (BEZET), ECG08: 407 MS
RBC # BLD AUTO: 4.72 M/UL (ref 4.2–5.3)
SODIUM SERPL-SCNC: 140 MMOL/L (ref 136–145)
SP GR UR REFRACTOMETRY: 1.03 (ref 1–1.03)
TROPONIN I SERPL-MCNC: <0.02 NG/ML (ref 0–0.04)
UROBILINOGEN UR QL STRIP.AUTO: 1 EU/DL (ref 0.2–1)
VENTRICULAR RATE, ECG03: 48 BPM
WBC # BLD AUTO: 5.8 K/UL (ref 4.6–13.2)

## 2017-09-08 PROCEDURE — 74011250636 HC RX REV CODE- 250/636: Performed by: EMERGENCY MEDICINE

## 2017-09-08 PROCEDURE — 80076 HEPATIC FUNCTION PANEL: CPT | Performed by: EMERGENCY MEDICINE

## 2017-09-08 PROCEDURE — 80048 BASIC METABOLIC PNL TOTAL CA: CPT | Performed by: EMERGENCY MEDICINE

## 2017-09-08 PROCEDURE — 93005 ELECTROCARDIOGRAM TRACING: CPT

## 2017-09-08 PROCEDURE — 85025 COMPLETE CBC W/AUTO DIFF WBC: CPT | Performed by: EMERGENCY MEDICINE

## 2017-09-08 PROCEDURE — 80307 DRUG TEST PRSMV CHEM ANLYZR: CPT | Performed by: EMERGENCY MEDICINE

## 2017-09-08 PROCEDURE — 96374 THER/PROPH/DIAG INJ IV PUSH: CPT

## 2017-09-08 PROCEDURE — 99283 EMERGENCY DEPT VISIT LOW MDM: CPT

## 2017-09-08 PROCEDURE — 81025 URINE PREGNANCY TEST: CPT | Performed by: EMERGENCY MEDICINE

## 2017-09-08 PROCEDURE — 82550 ASSAY OF CK (CPK): CPT | Performed by: EMERGENCY MEDICINE

## 2017-09-08 PROCEDURE — 83690 ASSAY OF LIPASE: CPT | Performed by: EMERGENCY MEDICINE

## 2017-09-08 PROCEDURE — 83735 ASSAY OF MAGNESIUM: CPT | Performed by: EMERGENCY MEDICINE

## 2017-09-08 PROCEDURE — 81003 URINALYSIS AUTO W/O SCOPE: CPT | Performed by: EMERGENCY MEDICINE

## 2017-09-08 RX ORDER — DIPHENOXYLATE HYDROCHLORIDE AND ATROPINE SULFATE 2.5; .025 MG/1; MG/1
1 TABLET ORAL
Qty: 20 TAB | Refills: 0 | OUTPATIENT
Start: 2017-09-08 | End: 2022-07-16

## 2017-09-08 RX ORDER — ONDANSETRON 4 MG/1
8 TABLET, FILM COATED ORAL
Qty: 12 TAB | Refills: 0 | OUTPATIENT
Start: 2017-09-08 | End: 2022-07-16

## 2017-09-08 RX ORDER — ONDANSETRON 2 MG/ML
4 INJECTION INTRAMUSCULAR; INTRAVENOUS
Status: COMPLETED | OUTPATIENT
Start: 2017-09-08 | End: 2017-09-08

## 2017-09-08 RX ORDER — DICYCLOMINE HYDROCHLORIDE 10 MG/1
10 CAPSULE ORAL 4 TIMES DAILY
Qty: 20 CAP | Refills: 0 | Status: SHIPPED | OUTPATIENT
Start: 2017-09-08 | End: 2017-09-13

## 2017-09-08 RX ADMIN — ONDANSETRON 4 MG: 2 INJECTION INTRAMUSCULAR; INTRAVENOUS at 13:32

## 2017-09-08 NOTE — ED TRIAGE NOTES
\"I've been having stomach cramps since last night.  This morning when I woke up the cramps were worst.\"

## 2017-09-08 NOTE — ED NOTES
Pt. Did not have any loose or diarrhea stools while in the ED. I have reviewed discharge instructions with the patient. The patient verbalized understanding.

## 2017-09-08 NOTE — LETTER
NOTIFICATION RETURN TO WORK / SCHOOL 
 
9/8/2017 2:31 PM 
 
Ms. Mayito Hernandez [address] [place] [address] To Whom It May Concern: 
 
Mayito Hernandez is currently under the care of [facility] EMERGENCY DEPT. She will return to work/school on: 9/9/17. If there are questions or concerns please have the patient contact our office.  
 
 
 
Sincerely, 
 
 
 
 
Lorena Harman PA-C

## 2017-09-08 NOTE — ED PROVIDER NOTES
Patient is a 20 y.o. female presenting with abdominal pain. The history is provided by the patient. Abdominal Pain    Associated symptoms include diarrhea and nausea. Pertinent negatives include no fever and no vomiting. pt presents with c/o diarrhea for past few days and epigastric cramping abd pain. +nausea; denies vomiting,  Melena, hematochezia, dysuria. Pain can radiate up into her chest.  Vaginal discharge but not SA. Last used cocaine yesterday; drinks several shots of vodka nightly and smokes 6 cigars daily. No meds tried pta for relief. Denies prior abd surgeries. Past Medical History:   Diagnosis Date    [facility]     [facility] 8/3/2010    Borderline personality disorder     Depression 8/3/2010    Major depression     Psychiatric disorder     Psychosis     PTSD (post-traumatic stress disorder) 8/3/2010    PTSD (post-traumatic stress disorder)     Schizophrenia ([facility]        History reviewed. No pertinent surgical history.       Family History:   Problem Relation Age of Onset    Cancer Maternal Grandfather      throat cancer    Hypertension Maternal Uncle     Diabetes Maternal Grandmother     Hypertension Maternal Grandmother     Cancer Maternal Grandmother      cervical cancer    Diabetes Paternal Grandmother     Hypertension Paternal Grandfather        Social History     Social History    Marital status: SINGLE     Spouse name: N/A    Number of children: N/A    Years of education: N/A     Occupational History    unemployed      Social History Main Topics    Smoking status: Current Every Day Smoker     Packs/day: 0.25     Years: 1.00     Types: Cigarettes    Smokeless tobacco: Never Used    Alcohol use 0.0 oz/week     0 Standard drinks or equivalent per week      Comment: occasionally    Drug use: 7.00 per week     Special: Marijuana      Comment: last used cocaine 6/2010    Sexual activity: Yes     Partners: Male     Birth control/ protection: None, Condom     Other Topics Concern     Service No    Blood Transfusions No    Caffeine Concern Yes     occasionally    Occupational Exposure No    Hobby Hazards No    Sleep Concern Yes    Stress Concern No    Weight Concern No    Special Diet No    Back Care No    Exercise Yes     walks a lot daily    Bike Helmet No     n/a    Seat Belt No    Self-Exams No     Social History Narrative    Safety issues/concerns: ( none)Domestic Violence, (none)Guns in home,(doesn't use)Sunscreen, (working)Smoke Detectors, (safe)Housing. ALLERGIES: Review of patient's allergies indicates no known allergies. Review of Systems   Constitutional: Negative. Negative for appetite change and fever. HENT: Negative. Eyes: Negative. Respiratory: Negative. Cardiovascular: Negative. Gastrointestinal: Positive for abdominal pain, diarrhea and nausea. Negative for vomiting. Endocrine: Negative. Genitourinary: Negative. Musculoskeletal: Negative. Skin: Negative. Allergic/Immunologic: Negative. Neurological: Negative. Hematological: Negative. Psychiatric/Behavioral: Negative. All other systems reviewed and are negative. Vitals:    09/08/17 1056   BP: 114/75   Pulse: 69   Resp: 16   Temp: 98.5 °F (36.9 °C)   SpO2: 98%   Weight: 52.2 kg (115 lb)            Physical Exam   Constitutional: She is oriented to person, place, and time. She appears well-developed. HENT:   Head: Normocephalic and atraumatic. Eyes: Pupils are equal, round, and reactive to light. Neck: No JVD present. No tracheal deviation present. No thyromegaly present. Cardiovascular: Normal rate, regular rhythm and normal heart sounds. Exam reveals no gallop and no friction rub. No murmur heard. Pulmonary/Chest: Effort normal and breath sounds normal. No stridor. No respiratory distress. She has no wheezes. She has no rales. She exhibits no tenderness. Abdominal: Soft.  She exhibits no distension and no mass. There is tenderness. There is no rebound and no guarding.   +mild epigastric TTP   Musculoskeletal: She exhibits no edema or tenderness. Lymphadenopathy:     She has no cervical adenopathy. Neurological: She is alert and oriented to person, place, and time. Skin: Skin is warm and dry. No rash noted. No erythema. No pallor. Psychiatric: She has a normal mood and affect. Her behavior is normal. Thought content normal.   Nursing note and vitals reviewed. MDM  Number of Diagnoses or Management Options  Abdominal pain, epigastric:   Cocaine abuse:   Diarrhea, unspecified type:   Non-intractable vomiting with nausea, unspecified vomiting type:   Diagnosis management comments: Differential: gastritis; mesenteric ischemia; pancreatitis; dehydration; UTI; electrolyte abnormalities; colitis; hepatitis; angina; NSTEMI; cardiomyopathy    Labs, exam, VS stable. D/c home. Pt sleeping.     ED Course       Procedures                       Recent Results (from the past 12 hour(s))   URINALYSIS W/ RFLX MICROSCOPIC    Collection Time: 09/08/17 11:00 AM   Result Value Ref Range    Color YELLOW      Appearance CLEAR      Specific gravity 1.030 1.005 - 1.030      pH (UA) 5.5 5.0 - 8.0      Protein NEGATIVE  NEG mg/dL    Glucose NEGATIVE  NEG mg/dL    Ketone NEGATIVE  NEG mg/dL    Bilirubin NEGATIVE  NEG      Blood NEGATIVE  NEG      Urobilinogen 1.0 0.2 - 1.0 EU/dL    Nitrites NEGATIVE  NEG      Leukocyte Esterase NEGATIVE  NEG     HCG URINE, QL    Collection Time: 09/08/17 11:00 AM   Result Value Ref Range    HCG urine, Ql. NEGATIVE  NEG     EKG, 12 LEAD, INITIAL    Collection Time: 09/08/17 12:30 PM   Result Value Ref Range    Ventricular Rate 48 BPM    Atrial Rate 48 BPM    P-R Interval 158 ms    QRS Duration 82 ms    Q-T Interval 456 ms    QTC Calculation (Bezet) 407 ms    Calculated P Axis 73 degrees    Calculated R Axis 63 degrees    Calculated T Axis 60 degrees    Diagnosis       Marked sinus bradycardia with sinus arrhythmia  Abnormal ECG  No previous ECGs available     LIPASE    Collection Time: 09/08/17 12:47 PM   Result Value Ref Range    Lipase 114 73 - 393 U/L   MAGNESIUM    Collection Time: 09/08/17 12:47 PM   Result Value Ref Range    Magnesium 2.6 1.6 - 2.6 mg/dL   CARDIAC PANEL,(CK, CKMB & TROPONIN)    Collection Time: 09/08/17 12:47 PM   Result Value Ref Range     (H) 26 - 192 U/L    CK - MB 1.1 <3.6 ng/ml    CK-MB Index 0.5 0.0 - 4.0 %    Troponin-I, Qt. <0.02 0.0 - 0.045 NG/ML   CBC WITH AUTOMATED DIFF    Collection Time: 09/08/17 12:47 PM   Result Value Ref Range    WBC 5.8 4.6 - 13.2 K/uL    RBC 4.72 4.20 - 5.30 M/uL    HGB 13.3 12.0 - 16.0 g/dL    HCT 39.4 35.0 - 45.0 %    MCV 83.5 74.0 - 97.0 FL    MCH 28.2 24.0 - 34.0 PG    MCHC 33.8 31.0 - 37.0 g/dL    RDW 14.9 (H) 11.6 - 14.5 %    PLATELET 820 995 - 199 K/uL    MPV 10.6 9.2 - 11.8 FL    NEUTROPHILS 48 40 - 73 %    LYMPHOCYTES 39 21 - 52 %    MONOCYTES 8 3 - 10 %    EOSINOPHILS 5 0 - 5 %    BASOPHILS 0 0 - 2 %    ABS. NEUTROPHILS 2.8 1.8 - 8.0 K/UL    ABS. LYMPHOCYTES 2.3 0.9 - 3.6 K/UL    ABS. MONOCYTES 0.5 0.05 - 1.2 K/UL    ABS. EOSINOPHILS 0.3 0.0 - 0.4 K/UL    ABS.  BASOPHILS 0.0 0.0 - 0.1 K/UL    DF AUTOMATED     METABOLIC PANEL, BASIC    Collection Time: 09/08/17 12:47 PM   Result Value Ref Range    Sodium 140 136 - 145 mmol/L    Potassium 3.9 3.5 - 5.5 mmol/L    Chloride 108 100 - 108 mmol/L    CO2 26 21 - 32 mmol/L    Anion gap 6 3.0 - 18 mmol/L    Glucose 71 (L) 74 - 99 mg/dL    BUN 9 7.0 - 18 MG/DL    Creatinine 0.96 0.6 - 1.3 MG/DL    BUN/Creatinine ratio 9 (L) 12 - 20      GFR est AA >60 >60 ml/min/1.73m2    GFR est non-AA >60 >60 ml/min/1.73m2    Calcium 8.0 (L) 8.5 - 10.1 MG/DL   HEPATIC FUNCTION PANEL    Collection Time: 09/08/17 12:47 PM   Result Value Ref Range    Protein, total 6.6 6.4 - 8.2 g/dL    Albumin 3.3 (L) 3.4 - 5.0 g/dL    Globulin 3.3 2.0 - 4.0 g/dL    A-G Ratio 1.0 0.8 - 1.7      Bilirubin, total 1.1 (H) 0.2 - 1.0 MG/DL    Bilirubin, direct 0.2 0.0 - 0.2 MG/DL    Alk. phosphatase 49 45 - 117 U/L    AST (SGOT) 19 15 - 37 U/L    ALT (SGPT) 17 13 - 56 U/L     2:04 PM    Diagnosis:   1. Abdominal pain, epigastric    2. Non-intractable vomiting with nausea, unspecified vomiting type    3. Cocaine abuse    4. Diarrhea, unspecified type          Disposition: home    Follow-up Information     Follow up With Details Comments Contact Info    Tiara Hilliard MD Schedule an appointment as soon as possible for a visit in 3 days  [address]  [place] [address] [address] [facility]      [facility]  If symptoms worsen return immediately [address] [place] [address]  [phone]          Patient's Medications   Start Taking    DICYCLOMINE (BENTYL) 10 MG CAPSULE    Take 1 Cap by mouth four (4) times daily for 5 days. DIPHENOXYLATE-ATROPINE (LOMOTIL) 2.5-0.025 MG PER TABLET    Take 1 Tab by mouth four (4) times daily as needed for Diarrhea. Max Daily Amount: 4 Tabs. ONDANSETRON HCL (ZOFRAN, AS HYDROCHLORIDE,) 4 MG TABLET    Take 2 Tabs by mouth every eight (8) hours as needed for Nausea. Continue Taking    DEXTROAMPHETAMINE-AMPHETAMINE (ADDERALL) 10 MG TABLET    Take 10 mg by mouth. OXCARBAZEPINE (TRILEPTAL) 150 MG TABLET    Take 150 mg by mouth three (3) times daily. TRAZODONE (DESYREL) 50 MG TABLET    Take  by mouth nightly.    These Medications have changed    No medications on file   Stop Taking    No medications on file

## 2017-10-13 ENCOUNTER — TELEPHONE (OUTPATIENT)
Dept: FAMILY MEDICINE CLINIC | Age: 22
End: 2017-10-13

## 2017-10-13 RX ORDER — MEDROXYPROGESTERONE ACETATE 150 MG/ML
150 INJECTION, SUSPENSION INTRAMUSCULAR ONCE
Qty: 1 ML | Refills: 1 | Status: SHIPPED | OUTPATIENT
Start: 2017-10-13 | End: 2018-03-21 | Stop reason: SDUPTHER

## 2017-10-13 NOTE — TELEPHONE ENCOUNTER
Pt needs refill on her depo trying to come in today to get done.  Depo due before the 24th of October

## 2018-02-17 ENCOUNTER — HOSPITAL ENCOUNTER (EMERGENCY)
Age: 23
Discharge: HOME | End: 2018-02-17
Attending: EMERGENCY MEDICINE
Payer: MEDICAID

## 2018-02-17 ENCOUNTER — APPOINTMENT (OUTPATIENT)
Dept: GENERAL RADIOLOGY | Age: 23
End: 2018-02-17
Attending: PHYSICIAN ASSISTANT
Payer: MEDICAID

## 2018-02-17 VITALS
RESPIRATION RATE: 16 BRPM | DIASTOLIC BLOOD PRESSURE: 77 MMHG | TEMPERATURE: 98.1 F | OXYGEN SATURATION: 100 % | HEART RATE: 96 BPM | SYSTOLIC BLOOD PRESSURE: 110 MMHG

## 2018-02-17 DIAGNOSIS — R07.89 MUSCULOSKELETAL CHEST PAIN: Primary | ICD-10-CM

## 2018-02-17 LAB — HCG UR QL: NEGATIVE

## 2018-02-17 PROCEDURE — 99284 EMERGENCY DEPT VISIT MOD MDM: CPT

## 2018-02-17 PROCEDURE — 71046 X-RAY EXAM CHEST 2 VIEWS: CPT

## 2018-02-17 PROCEDURE — 93005 ELECTROCARDIOGRAM TRACING: CPT

## 2018-02-17 PROCEDURE — 81025 URINE PREGNANCY TEST: CPT | Performed by: PHYSICIAN ASSISTANT

## 2018-02-17 RX ORDER — ETODOLAC 400 MG/1
400 TABLET, FILM COATED ORAL 2 TIMES DAILY WITH MEALS
Qty: 16 TAB | Refills: 0 | OUTPATIENT
Start: 2018-02-17 | End: 2022-07-16

## 2018-02-17 NOTE — ED PROVIDER NOTES
EMERGENCY DEPARTMENT HISTORY AND PHYSICAL EXAM    2:39 PM      Date: 2/17/2018  Patient Name: Jermain Cerrato    History of Presenting Illness     Chief Complaint   Patient presents with    Chest Pain         History Provided By: Patient    Chief Complaint: chest pain  Duration: 1 Years  Timing:  Intermittent  Location: left chest  Quality: Aching  Severity: Moderate  Modifying Factors: hurts more to breathe deeply  Associated Symptoms: denies any other associated signs or symptoms      Additional History (Context): Jermain Cerrato is a 20 y.o. female with No significant past medical history who presents with 1 year of intermittent moderate aching left chest pain that hurts more to breathe deeply. Pt has had intermittent chest pain for the past year with no known family history of heart disease. No other concerns or symptoms at this time. PCP: Myla Ahumada, MD    Current Outpatient Prescriptions   Medication Sig Dispense Refill    diphenoxylate-atropine (LOMOTIL) 2.5-0.025 mg per tablet Take 1 Tab by mouth four (4) times daily as needed for Diarrhea. Max Daily Amount: 4 Tabs. 20 Tab 0    ondansetron hcl (ZOFRAN, AS HYDROCHLORIDE,) 4 mg tablet Take 2 Tabs by mouth every eight (8) hours as needed for Nausea. 12 Tab 0    dextroamphetamine-amphetamine (ADDERALL) 10 mg tablet Take 10 mg by mouth.  OXcarbazepine (TRILEPTAL) 150 mg tablet Take 150 mg by mouth three (3) times daily.  traZODone (DESYREL) 50 mg tablet Take  by mouth nightly. Past History     Past Medical History:  Past Medical History:   Diagnosis Date    [facility]     Bipolar 2 disorder ([facility] 8/3/2010    Borderline personality disorder     Depression 8/3/2010    Major depression     Psychiatric disorder     Psychosis     PTSD (post-traumatic stress disorder) 8/3/2010    PTSD (post-traumatic stress disorder)     Schizophrenia ([facility]        Past Surgical History:  No past surgical history on file.     Family History:  Family History   Problem Relation Age of Onset    Cancer Maternal Grandfather      throat cancer    Hypertension Maternal Uncle     Diabetes Maternal Grandmother     Hypertension Maternal Grandmother     Cancer Maternal Grandmother      cervical cancer    Diabetes Paternal Grandmother     Hypertension Paternal Grandfather        Social History:  Social History   Substance Use Topics    Smoking status: Current Every Day Smoker     Packs/day: 0.25     Years: 1.00     Types: Cigarettes    Smokeless tobacco: Never Used    Alcohol use 0.0 oz/week     0 Standard drinks or equivalent per week      Comment: occasionally       Allergies:  No Known Allergies      Review of Systems       Review of Systems   Cardiovascular: Positive for chest pain. Gastrointestinal: Negative for abdominal pain. All other systems reviewed and are negative. Physical Exam     Visit Vitals    /77    Pulse 96    Temp 98.1 °F (36.7 °C)    Resp 16    SpO2 100%         Physical Exam   Constitutional: She appears well-developed and well-nourished. No distress. Cardiovascular: Normal rate, regular rhythm and normal heart sounds. Exam reveals no gallop and no friction rub. No murmur heard. Pulmonary/Chest: Effort normal and breath sounds normal. No respiratory distress. She has no wheezes. She has no rales. She exhibits tenderness. Mild mild sternal chest TTP   Skin: She is not diaphoretic. Nursing note and vitals reviewed. Diagnostic Study Results     Labs -  No results found for this or any previous visit (from the past 12 hour(s)). Radiologic Studies -   XR CHEST PA LAT    (Results Pending)         Medical Decision Making   I am the first provider for this patient. I reviewed the vital signs, available nursing notes, past medical history, past surgical history, family history and social history. Vital Signs-Reviewed the patient's vital signs. EKG: Interpreted by the EP.    Time Interpreted:     Rate: 82   Rhythm: Normal Sinus Rhythm sinus arrhythmia no STEMI invetered p-wave in v2 no other acute abnormailties      Records Reviewed: Nursing Notes and Old Medical Records (Time of Review: 2:39 PM)    Provider Notes (Medical Decision Making):     Patient was taken to Xr and became very irate when our XR tech asked her to get into a gown. She was screaming and yelling. She would not cooperate. I was able to calm her down and we were able to take an XR. I discussed with her, her results in real time. Will send over to [facility], Etodolac for her musculoskeletal chest pain. Patient agrees with the plan. [facility]      Diagnosis     Clinical Impression:   1. Musculoskeletal chest pain        Disposition: discharged      Follow-up Information     Follow up With Details Comments Contact Info    Saumya Pastor MD Go in 2 days [facility] [address]  [place] [address] [address] [facility]      [facility] EM[facility]GENCY DEPT Go to As needed, If symptoms worsen [address] [place] [id number]  [phone]           Patient's Medications   Start Taking    ETODOLAC (LODINE) 400 MG TABLET    Take 400 mg by mouth two (2) times daily (with meals). Continue Taking    DEXTROAMPHETAMINE-AMPHETAMINE (ADDERALL) 10 MG TABLET    Take 10 mg by mouth. DIPHENOXYLATE-ATROPINE (LOMOTIL) 2.5-0.025 MG PER TABLET    Take 1 Tab by mouth four (4) times daily as needed for Diarrhea. Max Daily Amount: 4 Tabs. ONDANSETRON HCL (ZOFRAN, AS HYDROCHLORIDE,) 4 MG TABLET    Take 2 Tabs by mouth every eight (8) hours as needed for Nausea. OXCARBAZEPINE (TRILEPTAL) 150 MG TABLET    Take 150 mg by mouth three (3) times daily. TRAZODONE (DESYREL) 50 MG TABLET    Take  by mouth nightly.    These Medications have changed    No medications on file   Stop Taking    No medications on file     _______________________________    Attestations:  Tabatha [address] [place] [address] acting as a scribe for and in the presence of Brian Reynolds PA-C      February 17, 2018 at 2:39 PM       Provider Attestation:      I personally performed the services described in the documentation, reviewed the documentation, as recorded by the scribe in my presence, and it accurately and completely records my words and actions.  February 17, 2018 at 2:39 PM - Brian Reynolds PA-C   _______________________________

## 2018-02-17 NOTE — ED TRIAGE NOTES
Patient complains of chest pain , patient states it has been going on for a year . Patient admit ts to smoking.

## 2018-02-17 NOTE — DISCHARGE INSTRUCTIONS

## 2018-02-18 LAB
ATRIAL RATE: 82 BPM
CALCULATED P AXIS, ECG09: 68 DEGREES
CALCULATED R AXIS, ECG10: 42 DEGREES
CALCULATED T AXIS, ECG11: 50 DEGREES
DIAGNOSIS, 93000: NORMAL
P-R INTERVAL, ECG05: 130 MS
Q-T INTERVAL, ECG07: 348 MS
QRS DURATION, ECG06: 72 MS
QTC CALCULATION (BEZET), ECG08: 406 MS
VENTRICULAR RATE, ECG03: 82 BPM

## 2018-03-21 RX ORDER — MEDROXYPROGESTERONE ACETATE 150 MG/ML
INJECTION, SUSPENSION INTRAMUSCULAR
Qty: 1 ML | Refills: 1 | OUTPATIENT
Start: 2018-03-21 | End: 2022-07-16

## 2020-03-03 ENCOUNTER — HOSPITAL ENCOUNTER (EMERGENCY)
Age: 25
Discharge: HOME | End: 2020-03-03
Attending: EMERGENCY MEDICINE
Payer: MEDICAID

## 2020-03-03 VITALS
SYSTOLIC BLOOD PRESSURE: 110 MMHG | OXYGEN SATURATION: 98 % | HEART RATE: 88 BPM | TEMPERATURE: 98.8 F | DIASTOLIC BLOOD PRESSURE: 64 MMHG | RESPIRATION RATE: 12 BRPM

## 2020-03-03 DIAGNOSIS — N75.0 BARTHOLIN CYST: Primary | ICD-10-CM

## 2020-03-03 LAB
APPEARANCE UR: CLEAR
BILIRUB UR QL: NEGATIVE
COLOR UR: YELLOW
GLUCOSE UR STRIP.AUTO-MCNC: NEGATIVE MG/DL
HCG UR QL: NEGATIVE
HGB UR QL STRIP: NEGATIVE
KETONES UR QL STRIP.AUTO: NEGATIVE MG/DL
LEUKOCYTE ESTERASE UR QL STRIP.AUTO: NEGATIVE
NITRITE UR QL STRIP.AUTO: NEGATIVE
PH UR STRIP: 6 [PH] (ref 5–8)
PROT UR STRIP-MCNC: NEGATIVE MG/DL
SERVICE CMNT-IMP: NORMAL
SP GR UR REFRACTOMETRY: 1.01 (ref 1–1.03)
UROBILINOGEN UR QL STRIP.AUTO: 0.2 EU/DL (ref 0.2–1)
WET PREP GENITAL: NORMAL

## 2020-03-03 PROCEDURE — 99283 EMERGENCY DEPT VISIT LOW MDM: CPT

## 2020-03-03 PROCEDURE — 81003 URINALYSIS AUTO W/O SCOPE: CPT

## 2020-03-03 PROCEDURE — 81025 URINE PREGNANCY TEST: CPT

## 2020-03-03 PROCEDURE — 87210 SMEAR WET MOUNT SALINE/INK: CPT

## 2020-03-03 PROCEDURE — 87661 TRICHOMONAS VAGINALIS AMPLIF: CPT

## 2020-03-03 PROCEDURE — 99282 EMERGENCY DEPT VISIT SF MDM: CPT

## 2020-03-03 RX ORDER — CEPHALEXIN 500 MG/1
500 CAPSULE ORAL 4 TIMES DAILY
Qty: 28 CAP | Refills: 0 | OUTPATIENT
Start: 2020-03-03 | End: 2020-03-03

## 2020-03-03 RX ORDER — SULFAMETHOXAZOLE AND TRIMETHOPRIM 800; 160 MG/1; MG/1
1 TABLET ORAL 2 TIMES DAILY
Qty: 14 TAB | Refills: 0 | Status: SHIPPED | OUTPATIENT
Start: 2020-03-03 | End: 2020-03-10

## 2020-03-03 NOTE — LETTER
NOTIFICATION RETURN TO WORK / SCHOOL 
 
3/3/2020 9:18 AM 
 
Ms. Александр Chu [address] [facility] 
[place] [address] To Whom It May Concern: 
 
Александр Chu is currently under the care of [facility]. She will return to work on Thursday Feb 05, 2020 If there are questions or concerns please have the patient contact our office. Sincerely, Vladislav Lockett NP

## 2020-03-03 NOTE — DISCHARGE INSTRUCTIONS
Patient Education        Bartholin Gland Cyst: Care Instructions  Your Care Instructions    The Bartholin glands are in a woman's vulva. This is the area around the vagina. The glands are normally about the size of a pea. They provide fluid to the vulvar area through a small opening. If the opening is blocked, the gland swells with fluid and forms a cyst. You can have a cyst for years with no symptoms. But if a cyst gets infected by bacteria, it can grow and become red and painful. This is called an abscess. Opening and draining the cyst usually cures the infection. You may have had a small tube (catheter) placed into the cyst or had minor surgery to let the cyst drain. The tube will usually be left in for at least 4 weeks. Your doctor may do a lab test to find out what kind of bacteria caused the infection. You may get antibiotics to kill the bacteria. You may have some drainage from the cyst for a few weeks. The gland should return to normal after the infection clears up. Follow-up care is a key part of your treatment and safety. Be sure to make and go to all appointments, and call your doctor if you are having problems. It's also a good idea to know your test results and keep a list of the medicines you take. How can you care for yourself at home? · If your doctor prescribed antibiotics, take them as directed. Do not stop taking them just because you feel better. You need to take the full course of antibiotics. · Sit in a few inches of warm water (sitz bath) 3 times a day and after bowel movements. The warm water helps the area heal and eases discomfort. · Take an over-the-counter pain medicine such as acetaminophen (Tylenol), ibuprofen (Advil, Motrin), or naproxen (Aleve). Read and follow all instructions on the label. · Do not take two or more pain medicines at the same time unless the doctor told you to. Many pain medicines have acetaminophen, which is Tylenol.  Too much acetaminophen (Tylenol) can be harmful. · Wear panty liners or pads if you have discharge from the draining cyst.  · If you are sexually active, avoid sex until:  ? You have finished the antibiotics. ? The area has healed. · If you had a catheter placed in the cyst to help it drain, follow your doctor's instructions for activities until the tube comes out. When should you call for help? Call your doctor now or seek immediate medical care if:    · You have symptoms of a new or worse infection, such as:  ? Increased pain, swelling, warmth, or redness. ? Red streaks leading from the area. ? Pus draining from the area. ? A fever.    Watch closely for changes in your health, and be sure to contact your doctor if:    · The catheter falls out.     · You are not getting better as expected. Where can you learn more? Go to http://faustina-gavin.info/. Enter H276 in the search box to learn more about \"Bartholin Gland Cyst: Care Instructions. \"  Current as of: February 19, 2019  Content Version: 12.2  © 0041-7190 [employer]. Care instructions adapted under license by [employer] (which disclaims liability or warranty for this information). If you have questions about a medical condition or this instruction, always ask your healthcare professional. [address] any warranty or liability for your use of this information.

## 2020-03-04 LAB
C TRACH RRNA SPEC QL NAA+PROBE: NEGATIVE
N GONORRHOEA RRNA SPEC QL NAA+PROBE: NEGATIVE
SPECIMEN SOURCE: NORMAL
T VAGINALIS RRNA SPEC QL NAA+PROBE: NEGATIVE

## 2020-03-24 ENCOUNTER — HOSPITAL ENCOUNTER (EMERGENCY)
Age: 25
Discharge: HOME | End: 2020-03-24
Attending: EMERGENCY MEDICINE
Payer: MEDICAID

## 2020-03-24 VITALS
TEMPERATURE: 97.1 F | BODY MASS INDEX: 20.09 KG/M2 | SYSTOLIC BLOOD PRESSURE: 127 MMHG | HEART RATE: 80 BPM | OXYGEN SATURATION: 98 % | DIASTOLIC BLOOD PRESSURE: 83 MMHG | RESPIRATION RATE: 14 BRPM | WEIGHT: 128 LBS | HEIGHT: 67 IN

## 2020-03-24 DIAGNOSIS — N75.1 BARTHOLIN'S GLAND ABSCESS: Primary | ICD-10-CM

## 2020-03-24 PROCEDURE — 99281 EMR DPT VST MAYX REQ PHY/QHP: CPT

## 2020-03-24 RX ORDER — SULFAMETHOXAZOLE AND TRIMETHOPRIM 800; 160 MG/1; MG/1
2 TABLET ORAL 2 TIMES DAILY
Qty: 40 TAB | Refills: 0 | Status: SHIPPED | OUTPATIENT
Start: 2020-03-24 | End: 2020-04-03

## 2020-03-24 NOTE — LETTER
[address] [place] [address] [facility] EMERGENCY DEPT 
[address] [facility] [id number] [phone] Work/School Note Date: 3/24/2020 To Whom It May concern: 
 
Brenda Loza was seen and treated today in the emergency room by the following provider(s): 
Attending Provider: Ashley Malone MD 
Physician Assistant: MERCEDES Boone. Brenda Loza may return to work on March 27, 2020 Jered Sandoval RN

## 2020-03-25 NOTE — ED PROVIDER NOTES
EMERGENCY DEPARTMENT HISTORY AND PHYSICAL EXAM    Date: 3/24/2020  Patient Name: Ann Zuniga    History of Presenting Illness     No chief complaint on file. History Provided By: Patient    Additional History (Context): Ann Zuniga is a 21 y.o. female with bipolar, PTSD who presents with persistent but improving left Bartholin's gland cyst times now 3 weeks. Seen here earlier this month. Things improved when she was doing regular sitz bath but returned to a larger size and fullness in the area. She is no longer sexually active. Does not want an I&D. Wants to have antibiotics. PCP: Jazmine Ang MD    Current Outpatient Medications   Medication Sig Dispense Refill    trimethoprim-sulfamethoxazole (Bactrim DS) 160-800 mg per tablet Take 2 Tabs by mouth two (2) times a day for 10 days. 40 Tab 0    medroxyPROGESTERone (DEPO-PROVERA) 150 mg/mL injection INJECT 1 ML ONCE FOR 1 DOSE. REPEAT IN 3 MONTHS. TO BE ADMINISTERED BY NURSING. 1 mL 1    etodolac (LODINE) 400 mg tablet Take 400 mg by mouth two (2) times daily (with meals). 16 Tab 0    diphenoxylate-atropine (LOMOTIL) 2.5-0.025 mg per tablet Take 1 Tab by mouth four (4) times daily as needed for Diarrhea. Max Daily Amount: 4 Tabs. 20 Tab 0    ondansetron hcl (ZOFRAN, AS HYDROCHLORIDE,) 4 mg tablet Take 2 Tabs by mouth every eight (8) hours as needed for Nausea. 12 Tab 0    dextroamphetamine-amphetamine (ADDERALL) 10 mg tablet Take 10 mg by mouth.  OXcarbazepine (TRILEPTAL) 150 mg tablet Take 150 mg by mouth three (3) times daily.  traZODone (DESYREL) 50 mg tablet Take  by mouth nightly.          Past History     Past Medical History:  Past Medical History:   Diagnosis Date    [facility]     Bipolar 2 disorder ([facility] 8/3/2010    Borderline personality disorder ([facility]     Depression 8/3/2010    Major depression     Psychiatric disorder     Psychosis ([facility]     PTSD (post-traumatic stress disorder) 8/3/2010    PTSD (post-traumatic stress disorder)     Schizophrenia [facility])        Past Surgical History:  History reviewed. No pertinent surgical history. Family History:  Family History   Problem Relation Age of Onset    Cancer Maternal Grandfather         throat cancer    Hypertension Maternal Uncle     Diabetes Maternal Grandmother     Hypertension Maternal Grandmother     Cancer Maternal Grandmother         cervical cancer    Diabetes Paternal Grandmother     Hypertension Paternal Grandfather        Social History:  Social History     Tobacco Use    Smoking status: Current Every Day Smoker     Packs/day: 0.25     Years: 1.00     Pack years: 0.25     Types: Cigarettes    Smokeless tobacco: Never Used   Substance Use Topics    Alcohol use: Yes     Alcohol/week: 0.0 standard drinks     Comment: occasionally    Drug use: Yes     Frequency: 7.0 times per week     Types: Marijuana     Comment: last used cocaine 6/2010       Allergies:  No Known Allergies      Review of Systems   Review of Systems   Genitourinary: Positive for vaginal pain. Negative for vaginal discharge. All Other Systems Negative  Physical Exam     Vitals:    03/24/20 2000   BP: 127/83   Pulse: 80   Resp: 14   Temp: 97.1 °F (36.2 °C)   SpO2: 98%   Weight: 58.1 kg (128 lb)   Height: 5' 7\" (1.702 m)     Physical Exam  Vitals signs and nursing note reviewed. Constitutional:       Appearance: She is well-developed. HENT:      Head: Normocephalic and atraumatic. Eyes:      Pupils: Pupils are equal, round, and reactive to light. Neck:      Thyroid: No thyromegaly. Vascular: No JVD. Trachea: No tracheal deviation. Cardiovascular:      Rate and Rhythm: Normal rate and regular rhythm. Heart sounds: Normal heart sounds. No murmur. No friction rub. No gallop. Pulmonary:      Effort: Pulmonary effort is normal. No respiratory distress. Breath sounds: Normal breath sounds. No stridor. No wheezing or rales.    Chest:      Chest wall: No tenderness. Abdominal:      General: There is no distension. Palpations: Abdomen is soft. There is no mass. Tenderness: There is no abdominal tenderness. There is no guarding or rebound. Genitourinary:     Comments: Left Bartholin's gland cyst abscess is draining slightly and is tender. No other valvular swelling. Musculoskeletal:         General: No tenderness. Lymphadenopathy:      Cervical: No cervical adenopathy. Skin:     General: Skin is warm and dry. Coloration: Skin is not pale. Findings: No erythema or rash. Neurological:      Mental Status: She is alert and oriented to person, place, and time. Psychiatric:         Behavior: Behavior normal.         Thought Content: Thought content normal.          Diagnostic Study Results     Labs -   No results found for this or any previous visit (from the past 12 hour(s)). Radiologic Studies -   No orders to display     CT Results  (Last 48 hours)    None        CXR Results  (Last 48 hours)    None            Medical Decision Making   I am the first provider for this patient. I reviewed the vital signs, available nursing notes, past medical history, past surgical history, family history and social history. Vital Signs-Reviewed the patient's vital signs. Records Reviewed: Nursing Notes and Old Medical Records    Procedures:  Procedures    Provider Notes (Medical Decision Making): Renew her Bactrim DS. Offered I&D. Patient was adamant that she did not want that and was doing well with the regular sits baths and antibiotics and will return to her regimen. Do her work note as well. Referred to GYN. MED RECONCILIATION:  No current facility-administered medications for this encounter. Current Outpatient Medications   Medication Sig    trimethoprim-sulfamethoxazole (Bactrim DS) 160-800 mg per tablet Take 2 Tabs by mouth two (2) times a day for 10 days.     medroxyPROGESTERone (DEPO-PROVERA) 150 mg/mL injection INJECT 1 ML ONCE FOR 1 DOSE. REPEAT IN 3 MONTHS. TO BE ADMINISTERED BY NURSING.  etodolac (LODINE) 400 mg tablet Take 400 mg by mouth two (2) times daily (with meals).  diphenoxylate-atropine (LOMOTIL) 2.5-0.025 mg per tablet Take 1 Tab by mouth four (4) times daily as needed for Diarrhea. Max Daily Amount: 4 Tabs.  ondansetron hcl (ZOFRAN, AS HYDROCHLORIDE,) 4 mg tablet Take 2 Tabs by mouth every eight (8) hours as needed for Nausea.  dextroamphetamine-amphetamine (ADDERALL) 10 mg tablet Take 10 mg by mouth.  OXcarbazepine (TRILEPTAL) 150 mg tablet Take 150 mg by mouth three (3) times daily.  traZODone (DESYREL) 50 mg tablet Take  by mouth nightly. Disposition:  home    DISCHARGE NOTE:   8:27 PM    Pt has been reexamined. Patient has no new complaints, changes, or physical findings. Care plan outlined and precautions discussed. Results of exam were reviewed with the patient. All medications were reviewed with the patient; will d/c home with bactrim ds. All of pt's questions and concerns were addressed. Patient was instructed and agrees to follow up with GYN, as well as to return to the ED upon further deterioration. Patient is ready to go home. Follow-up Information     Follow up With Specialties Details Why Contact Info    [facility] Obstetrics & Gynecology -[facility]  Schedule an appointment as soon as possible for a visit in 2 days  [address] [address] ([facility]) [phone]    [facility] EMERGENCY DEPT Emergency Medicine  If symptoms worsen return immediately [address] [facility] [address]  [phone]          Current Discharge Medication List      START taking these medications    Details   trimethoprim-sulfamethoxazole (Bactrim DS) 160-800 mg per tablet Take 2 Tabs by mouth two (2) times a day for 10 days. Qty: 40 Tab, Refills: 0               Diagnosis     Clinical Impression:   1.  Bartholin's gland abscess

## 2020-03-25 NOTE — DISCHARGE INSTRUCTIONS
Patient Education        Bartholin Gland Cyst: Care Instructions  Your Care Instructions    The Bartholin glands are in a woman's vulva. This is the area around the vagina. The glands are normally about the size of a pea. They provide fluid to the vulvar area through a small opening. If the opening is blocked, the gland swells with fluid and forms a cyst. You can have a cyst for years with no symptoms. But if a cyst gets infected by bacteria, it can grow and become red and painful. This is called an abscess. Opening and draining the cyst usually cures the infection. You may have had a small tube (catheter) placed into the cyst or had minor surgery to let the cyst drain. The tube will usually be left in for at least 4 weeks. Your doctor may do a lab test to find out what kind of bacteria caused the infection. You may get antibiotics to kill the bacteria. You may have some drainage from the cyst for a few weeks. The gland should return to normal after the infection clears up. Follow-up care is a key part of your treatment and safety. Be sure to make and go to all appointments, and call your doctor if you are having problems. It's also a good idea to know your test results and keep a list of the medicines you take. How can you care for yourself at home? · If your doctor prescribed antibiotics, take them as directed. Do not stop taking them just because you feel better. You need to take the full course of antibiotics. · Sit in a few inches of warm water (sitz bath) 3 times a day and after bowel movements. The warm water helps the area heal and eases discomfort. · Take an over-the-counter pain medicine such as acetaminophen (Tylenol), ibuprofen (Advil, Motrin), or naproxen (Aleve). Read and follow all instructions on the label. · Do not take two or more pain medicines at the same time unless the doctor told you to. Many pain medicines have acetaminophen, which is Tylenol.  Too much acetaminophen (Tylenol) can be harmful. · Wear panty liners or pads if you have discharge from the draining cyst.  · If you are sexually active, avoid sex until:  ? You have finished the antibiotics. ? The area has healed. · If you had a catheter placed in the cyst to help it drain, follow your doctor's instructions for activities until the tube comes out. When should you call for help? Call your doctor now or seek immediate medical care if:    · You have symptoms of a new or worse infection, such as:  ? Increased pain, swelling, warmth, or redness. ? Red streaks leading from the area. ? Pus draining from the area. ? A fever.    Watch closely for changes in your health, and be sure to contact your doctor if:    · The catheter falls out.     · You are not getting better as expected. Where can you learn more? Go to http://faustina-gavin.info/  Enter H276 in the search box to learn more about \"Bartholin Gland Cyst: Care Instructions. \"  Current as of: November 7, 2019Content Version: 12.4  © 8311-3656 [employer]. Care instructions adapted under license by [employer] (which disclaims liability or warranty for this information). If you have questions about a medical condition or this instruction, always ask your healthcare professional. [address] any warranty or liability for your use of this information.

## 2020-11-12 NOTE — ED PROVIDER NOTES
EMERGENCY DEPARTMENT HISTORY AND PHYSICAL EXAM    9:48 AM      Date: 3/3/2020  Patient Name: Lacy Lopez    History of Presenting Illness     No chief complaint on file. History Provided By: Patient  Chief complaint: Bartholin cyst    Duration: 5 days. Timing: on going with slow improvement   Location: left labia majora   Associated Symptoms: she was seen at [facility] on Saturday for left labia majora bartholin cyst. No incision or drain performed. She was instructed to report to the ED for further assessment. She states it began to drain and slowly improved with sitz baths however she feels she needs antibiotics. She has not prior hx of bartholin cyst.     Additional History (Context): Lacy Lopez is a 21 y.o. female with no significant past medical history who presented to the ED with chief complaint of left labia majora bartholin cyst.     PCP: Tisha Erickson MD    Current Outpatient Medications   Medication Sig Dispense Refill    trimethoprim-sulfamethoxazole (BACTRIM DS) 160-800 mg per tablet Take 1 Tab by mouth two (2) times a day for 7 days. 14 Tab 0    medroxyPROGESTERone (DEPO-PROVERA) 150 mg/mL injection INJECT 1 ML ONCE FOR 1 DOSE. REPEAT IN 3 MONTHS. TO BE ADMINISTERED BY NURSING. 1 mL 1    etodolac (LODINE) 400 mg tablet Take 400 mg by mouth two (2) times daily (with meals). 16 Tab 0    diphenoxylate-atropine (LOMOTIL) 2.5-0.025 mg per tablet Take 1 Tab by mouth four (4) times daily as needed for Diarrhea. Max Daily Amount: 4 Tabs. 20 Tab 0    ondansetron hcl (ZOFRAN, AS HYDROCHLORIDE,) 4 mg tablet Take 2 Tabs by mouth every eight (8) hours as needed for Nausea. 12 Tab 0    dextroamphetamine-amphetamine (ADDERALL) 10 mg tablet Take 10 mg by mouth.  OXcarbazepine (TRILEPTAL) 150 mg tablet Take 150 mg by mouth three (3) times daily.  traZODone (DESYREL) 50 mg tablet Take  by mouth nightly.          Past History     Past Medical History:  Past Medical History:   Diagnosis Date  [facility] ([facility]     Bipolar 2 disorder ([facility] 8/3/2010    Borderline personality disorder     Depression 8/3/2010    Major depression     Psychiatric disorder     Psychosis     PTSD (post-traumatic stress disorder) 8/3/2010    PTSD (post-traumatic stress disorder)     Schizophrenia ([facility]        Past Surgical History:  No past surgical history on file. Family History:  Family History   Problem Relation Age of Onset    Cancer Maternal Grandfather         throat cancer    Hypertension Maternal Uncle     Diabetes Maternal Grandmother     Hypertension Maternal Grandmother     Cancer Maternal Grandmother         cervical cancer    Diabetes Paternal Grandmother     Hypertension Paternal Grandfather        Social History:  Social History     Tobacco Use    Smoking status: Current Every Day Smoker     Packs/day: 0.25     Years: 1.00     Pack years: 0.25     Types: Cigarettes    Smokeless tobacco: Never Used   Substance Use Topics    Alcohol use: Yes     Alcohol/week: 0.0 standard drinks     Comment: occasionally    Drug use: Yes     Frequency: 7.0 times per week     Types: Marijuana     Comment: last used cocaine 6/2010       Allergies:  No Known Allergies      Review of Systems       Review of Systems   Constitutional: Negative for chills, diaphoresis, fatigue and fever. HENT: Negative for congestion, ear pain and sore throat. Eyes: Negative for pain. Respiratory: Negative for cough, chest tightness, shortness of breath and wheezing. Cardiovascular: Negative for chest pain, palpitations and leg swelling. Gastrointestinal: Negative for abdominal pain, constipation, diarrhea, nausea and vomiting. Genitourinary: Positive for vaginal discharge (left labia majora bartholin cyst) and vaginal pain (left labia majora ). Negative for dysuria, flank pain, hematuria, pelvic pain and vaginal bleeding.    Musculoskeletal: Negative for back pain, joint swelling, neck pain and neck stiffness. Neurological: Negative for dizziness, weakness, light-headedness and headaches. Physical Exam   There were no vitals taken for this visit. Physical Exam  Vitals signs and nursing note reviewed. Exam conducted with a chaperone present. Constitutional:       General: She is not in acute distress. Appearance: Normal appearance. She is not ill-appearing, toxic-appearing or diaphoretic. HENT:      Head: Normocephalic and atraumatic. Right Ear: Tympanic membrane normal.      Left Ear: Tympanic membrane normal.      Nose: No congestion or rhinorrhea. Mouth/Throat:      Pharynx: No oropharyngeal exudate or posterior oropharyngeal erythema. Eyes:      Conjunctiva/sclera: Conjunctivae normal.   Neck:      Musculoskeletal: Normal range of motion and neck supple. No neck rigidity or muscular tenderness. Cardiovascular:      Rate and Rhythm: Normal rate and regular rhythm. Pulses: Normal pulses. Heart sounds: Normal heart sounds. No murmur. Pulmonary:      Effort: Pulmonary effort is normal. No respiratory distress. Breath sounds: Normal breath sounds. No wheezing. Abdominal:      General: Bowel sounds are normal. There is no distension. Palpations: Abdomen is soft. Tenderness: There is no abdominal tenderness. There is no right CVA tenderness or left CVA tenderness. Genitourinary:     General: Normal vulva. Exam position: Knee-chest position. Pubic Area: No rash or pubic lice. Labia:         Right: No rash, tenderness, lesion or injury. Left: Tenderness and lesion (bartholin cyst) present. No rash or injury. Musculoskeletal: Normal range of motion. Lymphadenopathy:      Lower Body: No right inguinal adenopathy. No left inguinal adenopathy. Skin:     General: Skin is warm and dry. Neurological:      General: No focal deficit present. Mental Status: She is alert and oriented to person, place, and time. Psychiatric:         Behavior: Behavior normal.       Diagnostic Study Results     Labs -  Recent Results (from the past 12 hour(s))   URINALYSIS W/ RFLX MICROSCOPIC    Collection Time: 03/03/20  8:33 AM   Result Value Ref Range    Color YELLOW      Appearance CLEAR      Specific gravity 1.010 1.005 - 1.030      pH (UA) 6.0 5.0 - 8.0      Protein NEGATIVE  NEG mg/dL    Glucose NEGATIVE  NEG mg/dL    Ketone NEGATIVE  NEG mg/dL    Bilirubin NEGATIVE  NEG      Blood NEGATIVE  NEG      Urobilinogen 0.2 0.2 - 1.0 EU/dL    Nitrites NEGATIVE  NEG      Leukocyte Esterase NEGATIVE  NEG     HCG URINE, QL    Collection Time: 03/03/20  8:33 AM   Result Value Ref Range    HCG urine, QL NEGATIVE  NEG     WET PREP    Collection Time: 03/03/20  9:15 AM   Result Value Ref Range    Special Requests: NO SPECIAL REQUESTS      Wet prep NO YEAST,TRICHOMONAS OR CLUE CELLS NOTED         Radiologic Studies -   No orders to display         Medical Decision Making   I am the first provider for this patient. I reviewed the vital signs, available nursing notes, past medical history, past surgical history, family history and social history. Vital Signs-Reviewed the patient's vital signs. Records Reviewed: Nursing Notes and Old Medical Records (Time of Review: 9:48 AM)    ED Course: Progress Notes, Reevaluation, and Consults:    Provider Notes (Medical Decision Making): This is a 21year old female with no significant past medial history who presented to the ED with chief complaint of bartholin cyst left labia majora on going but slowly improving for 5 days. She has no prior hx of bartholin cyst. Of note, she was seen at [facility] on Saturday for left labia majora bartholin cyst. No incision or drain performed. She was instructed to report to the ED for I & D. She states it began to drain on its own and because she has a fear of needles, she did not see follow through as instructed.  States cyst slowly improved with sitz baths however she feels she needs antibiotics. No fevers or chills. States foul smelling drainage. Her vitals are stable. She is afebrile. Pelvic exam with left small bartholin cyst, no labia swelling, with scant discharge. 8:30 AM. Ceasar Calle and myself had a long discussion with patient at bedside regarding pros and cons of incision and drain of bartholin cyst. Patient began crying excessively, agitated stating she has a fear of needles and cannot handle incision and drain. Procedure explained to patient including numbing with lidocaine and pre procedure medication with percocet. Patient continued to adamantly decline incision and drain. States she will continue sitz baths with massage. 9:14 AM. Urinalysis, urine HCG, wet prep negative. Chlamydia / gonorrhea / trichomonas pending. Again discussed need for incision and drain with patient and significant other at bedside. Patient again refused. She states no concern for STIs as she has been with same partner for 3 years. She is stable for discharge home with bactrim DS, sitz baths, and referral to OBGYN. Diagnosis     Clinical Impression:   1. Bartholin cyst        Disposition: home     9:48 AM  Reviewed results with patient. Discussed need for close outpatient follow-up this week for reassessment. Follow-up Information     Follow up With Specialties Details Why [facility]    [facility] EMERGENCY DEPT Emergency Medicine  If symptoms worsen [address] [place] [id number]  [phone]    Makayla Duggan MD Family Practice In 2 days bartholins cyst [address] [facility]  [address] [facility]  [address] [facility] [address]  [address]   In 2 days BARTHOLINS CYST            Patient's Medications   Start Taking    TRIMETHOPRIM-SULFAMETHOXAZOLE (BACTRIM DS) 160-800 MG PER TABLET    Take 1 Tab by mouth two (2) times a day for 7 days. Continue Taking    DEXTROAMPHETAMINE-AMPHETAMINE (ADDERALL) 10 MG TABLET    Take 10 mg by mouth.     DIPHENOXYLATE-ATROPINE (LOMOTIL) 2.5-0.025 MG PER TABLET    Take 1 Tab by mouth four (4) times daily as needed for Diarrhea. Max Daily Amount: 4 Tabs. ETODOLAC (LODINE) 400 MG TABLET    Take 400 mg by mouth two (2) times daily (with meals). MEDROXYPROGESTERONE (DEPO-PROVERA) 150 MG/ML INJECTION    INJECT 1 ML ONCE FOR 1 DOSE. REPEAT IN 3 MONTHS. TO BE ADMINISTERED BY NURSING. ONDANSETRON HCL (ZOFRAN, AS HYDROCHLORIDE,) 4 MG TABLET    Take 2 Tabs by mouth every eight (8) hours as needed for Nausea. OXCARBAZEPINE (TRILEPTAL) 150 MG TABLET    Take 150 mg by mouth three (3) times daily. TRAZODONE (DESYREL) 50 MG TABLET    Take  by mouth nightly. These Medications have changed    No medications on file   Stop Taking    No medications on file       Dictation disclaimer:  Please note that this dictation was completed with [employer], the computer voice recognition software. Quite often unanticipated grammatical, syntax, homophones, and other interpretive errors are inadvertently transcribed by the computer software. Please disregard these errors. Please excuse any errors that have escaped final proofreading. FOLLOW UP:  in 1 week.   Your appointment has been made for _______. Call the office at  with any questions.  WOUND CARE: You may shower tomorrow 11/13; soap and water over both groin incision sites. Do not scrub. Pat dry when done. If no drainage, no need for dressing.     ACTIVITY: Ambulate as tolerated, but no heavy lifting (>10lbs) or strenuous exercise.    Call the office if you experience increasing pain, redness, swelling or drainage from incision sites, increased leg pain/numbness/tingling/weakness or temperature >101.4F.     DISCHARGE DESTINATION: Home    Medication: You may  Percocet for pain control at [facility] in the lobby of the hospital. FOLLOW UP: Dr. Hdez in 1 week.   Your appointment has been made for 11/17/20 at 9:30am. Call the office at  with any questions.  WOUND CARE: You may shower tomorrow 11/13; soap and water over both groin incision sites. Do not scrub. Pat dry when done. If no drainage, no need for dressing.     ACTIVITY: Ambulate as tolerated, but no heavy lifting (>10lbs) or strenuous exercise.    Call the office if you experience increasing pain, redness, swelling or drainage from incision sites, increased leg pain/numbness/tingling/weakness or temperature >101.4F.     DISCHARGE DESTINATION: Home    Medication change: Do no resume your valsartan/amlodipine combo medication - Valsartan has been recalled by FDA and is no longer being used due to potential side effects.  You may resume Amlodipine 10mg daily, new RX has been sent to your pharmacy. Please follow up with your primary care doctor in the next 2 weeks to update them on the medication change.    You were also prescribed Percocet for pain control and can pick it up at your pharmacy as well.

## 2022-07-13 ENCOUNTER — HOSPITAL ENCOUNTER (EMERGENCY)
Age: 27
Discharge: HOME | End: 2022-07-13
Attending: STUDENT IN AN ORGANIZED HEALTH CARE EDUCATION/TRAINING PROGRAM
Payer: MEDICAID

## 2022-07-13 VITALS
OXYGEN SATURATION: 98 % | DIASTOLIC BLOOD PRESSURE: 76 MMHG | TEMPERATURE: 98.8 F | HEIGHT: 63 IN | SYSTOLIC BLOOD PRESSURE: 116 MMHG | BODY MASS INDEX: 27.46 KG/M2 | WEIGHT: 155 LBS | HEART RATE: 88 BPM | RESPIRATION RATE: 18 BRPM

## 2022-07-13 DIAGNOSIS — N75.0 BARTHOLIN'S CYST: ICD-10-CM

## 2022-07-13 DIAGNOSIS — Z32.01 POSITIVE PREGNANCY TEST: Primary | ICD-10-CM

## 2022-07-13 LAB
APPEARANCE UR: CLEAR
BILIRUB UR QL: NEGATIVE
C TRACH RRNA SPEC QL NAA+PROBE: NEGATIVE
COLOR UR: YELLOW
GLUCOSE UR STRIP.AUTO-MCNC: NEGATIVE MG/DL
HCG UR QL: POSITIVE
HGB UR QL STRIP: NEGATIVE
KETONES UR QL STRIP.AUTO: NEGATIVE MG/DL
LEUKOCYTE ESTERASE UR QL STRIP.AUTO: NEGATIVE
N GONORRHOEA RRNA SPEC QL NAA+PROBE: NEGATIVE
NITRITE UR QL STRIP.AUTO: NEGATIVE
PH UR STRIP: 7.5 [PH] (ref 5–8)
PROT UR STRIP-MCNC: NEGATIVE MG/DL
SERVICE CMNT-IMP: NORMAL
SP GR UR REFRACTOMETRY: 1.02 (ref 1–1.03)
SPECIMEN SOURCE: NORMAL
UROBILINOGEN UR QL STRIP.AUTO: 1 EU/DL (ref 0.2–1)
WET PREP GENITAL: NORMAL

## 2022-07-13 PROCEDURE — 99283 EMERGENCY DEPT VISIT LOW MDM: CPT

## 2022-07-13 PROCEDURE — 81003 URINALYSIS AUTO W/O SCOPE: CPT

## 2022-07-13 PROCEDURE — 81025 URINE PREGNANCY TEST: CPT

## 2022-07-13 PROCEDURE — 87491 CHLMYD TRACH DNA AMP PROBE: CPT

## 2022-07-13 PROCEDURE — 87210 SMEAR WET MOUNT SALINE/INK: CPT

## 2022-07-13 RX ORDER — CEPHALEXIN 500 MG/1
500 CAPSULE ORAL 4 TIMES DAILY
Qty: 28 CAPSULE | Refills: 0 | OUTPATIENT
Start: 2022-07-13 | End: 2022-07-16

## 2022-07-13 NOTE — Clinical Note
[address] [place] [address] Dr AFIA [facility]Y [facility] EMERGENCY DEPT  [address] [id number] [phone]    Work/School Note    Date: 7/13/2022    To Whom It May concern:      Ben Cortés was seen and treated today in the emergency room by the following provider(s):  Attending Provider: Kelly Garcia MD  Physician Assistant: MERCEDES Madrid. Ben Cortés is excused from work/school on 07/13/22. She is clear to return to work/school on 07/14/22.         Sincerely,          MERCEDES Corrigan

## 2022-07-13 NOTE — ED TRIAGE NOTES
Pt states she has a \"cyst in my groin area that is getting bigger and bigger\". Pt states she is not sure how long it has been there however states it is very painful. Pt also reports pain with urination.

## 2022-07-13 NOTE — ED PROVIDER NOTES
EMERGENCY DEPARTMENT HISTORY AND PHYSICAL EXAM    Date: 7/13/2022  Patient Name: Kristine Beach    History of Presenting Illness     Chief Complaint   Patient presents with    Abscess    Urinary Pain         History Provided By: Patient    Chief Complaint: Cyst, dysuria  Duration: Couple days  Timing: Gradual  Location: Vagina  Quality: Cyst  Severity: Moderate  Modifying Factors: None  Associated Symptoms: none       Additional History (Context): Kristine Beach is a 31 y.o. female with a history of bipolar disorder, schizophrenia and borderline personality disorder who presents today for history as listed above. Patient reports she has had a cyst in her vagina in the past and it feels similar. Denies any vaginal discharge or concern for STDs. Reports dysuria. Denies any recent fevers, chills, nausea or vomiting. PCP: Tania Kowalski MD    Current Outpatient Medications   Medication Sig Dispense Refill    cephALEXin (Keflex) 500 mg capsule Take 1 Capsule by mouth four (4) times daily for 7 days. 28 Capsule 0    medroxyPROGESTERone (DEPO-PROVERA) 150 mg/mL injection INJECT 1 ML ONCE FOR 1 DOSE. REPEAT IN 3 MONTHS. TO BE ADMINISTERED BY NURSING. 1 mL 1    etodolac (LODINE) 400 mg tablet Take 400 mg by mouth two (2) times daily (with meals). 16 Tab 0    diphenoxylate-atropine (LOMOTIL) 2.5-0.025 mg per tablet Take 1 Tab by mouth four (4) times daily as needed for Diarrhea. Max Daily Amount: 4 Tabs. 20 Tab 0    ondansetron hcl (ZOFRAN, AS HYDROCHLORIDE,) 4 mg tablet Take 2 Tabs by mouth every eight (8) hours as needed for Nausea. 12 Tab 0    dextroamphetamine-amphetamine (ADDERALL) 10 mg tablet Take 10 mg by mouth.  OXcarbazepine (TRILEPTAL) 150 mg tablet Take 150 mg by mouth three (3) times daily.  traZODone (DESYREL) 50 mg tablet Take  by mouth nightly.          Past History     Past Medical History:  Past Medical History:   Diagnosis Date    Bipolar [facility] disorder ([facility] [address]     Bipolar 2 disorder ([facility] 8/3/2010    Borderline personality disorder ([facility]     Depression 8/3/2010    Major depression     Psychiatric disorder     Psychosis ([facility]     PTSD (post-traumatic stress disorder) 8/3/2010    PTSD (post-traumatic stress disorder)     Schizophrenia ([place] [address].)        Past Surgical History:  No past surgical history on file. Family History:  Family History   Problem Relation Age of Onset    Cancer Maternal Grandfather         throat cancer    Hypertension Maternal Uncle     Diabetes Maternal Grandmother     Hypertension Maternal Grandmother     Cancer Maternal Grandmother         cervical cancer    Diabetes Paternal Grandmother     Hypertension Paternal Grandfather        Social History:  Social History     Tobacco Use    Smoking status: Current Every Day Smoker     Packs/day: 0.25     Years: 1.00     Pack years: 0.25     Types: Cigarettes    Smokeless tobacco: Never Used   Substance Use Topics    Alcohol use: Yes     Alcohol/week: 0.0 standard drinks     Comment: occasionally    Drug use: Yes     Frequency: 7.0 times per week     Types: Marijuana     Comment: last used cocaine 6/2010       Allergies:  No Known Allergies      Review of Systems   Review of Systems   Constitutional: Negative for chills and fever. HENT: Negative for congestion, rhinorrhea and sore throat. Respiratory: Negative for cough and shortness of breath. Cardiovascular: Negative for chest pain. Gastrointestinal: Negative for abdominal pain, blood in stool, constipation, diarrhea, nausea and vomiting. Genitourinary: Positive for dysuria and vaginal pain. Negative for frequency and hematuria. Musculoskeletal: Negative for back pain and myalgias. Skin: Negative for rash and wound. Neurological: Negative for dizziness and headaches. All other systems reviewed and are negative.     All Other Systems Negative  Physical Exam     Vitals:    07/13/22 0751   BP: 116/76   Pulse: 88   Resp: 18   Temp: 98.8 °F (37.1 °C)   SpO2: 98%   Weight: 70.3 kg (155 lb)   Height: 5' 3\" (1.6 m)     Physical Exam  Vitals and nursing note reviewed. Constitutional:       General: She is not in acute distress. Appearance: She is well-developed. She is not diaphoretic. HENT:      Head: Normocephalic and atraumatic. Eyes:      Conjunctiva/sclera: Conjunctivae normal.   Cardiovascular:      Rate and Rhythm: Normal rate and regular rhythm. Heart sounds: Normal heart sounds. Pulmonary:      Effort: Pulmonary effort is normal. No respiratory distress. Breath sounds: Normal breath sounds. Chest:      Chest wall: No tenderness. Abdominal:      General: Bowel sounds are normal. There is no distension. Palpations: Abdomen is soft. Tenderness: There is no abdominal tenderness. There is no guarding or rebound. Genitourinary:     Comments: Bartholin cyst noted to the left side. No active drainage. Tenderness to palpation. Musculoskeletal:         General: No deformity. Cervical back: Normal range of motion and neck supple. Skin:     General: Skin is warm and dry. Neurological:      Mental Status: She is alert and oriented to person, place, and time. Deep Tendon Reflexes: Reflexes are normal and symmetric.            Diagnostic Study Results     Labs -     Recent Results (from the past 12 hour(s))   URINALYSIS W/ RFLX MICROSCOPIC    Collection Time: 07/13/22  7:55 AM   Result Value Ref Range    Color YELLOW      Appearance CLEAR      Specific gravity 1.018 1.005 - 1.030      pH (UA) 7.5 5.0 - 8.0      Protein Negative NEG mg/dL    Glucose Negative NEG mg/dL    Ketone Negative NEG mg/dL    Bilirubin Negative NEG      Blood Negative NEG      Urobilinogen 1.0 0.2 - 1.0 EU/dL    Nitrites Negative NEG      Leukocyte Esterase Negative NEG     HCG URINE, QL    Collection Time: 07/13/22  7:55 AM   Result Value Ref Range    HCG urine, QL Positive (A) NEG     WET PREP    Collection Time: 07/13/22  8:00 AM Specimen: Vagina   Result Value Ref Range    Special Requests: NO SPECIAL REQUESTS      Wet prep NO YEAST,TRICHOMONAS OR CLUE CELLS NOTED         Radiologic Studies -   No orders to display     CT Results  (Last 48 hours)    None        CXR Results  (Last 48 hours)    None            Medical Decision Making   I am the first provider for this patient. I reviewed the vital signs, available nursing notes, past medical history, past surgical history, family history and social history. Vital Signs-Reviewed the patient's vital signs. Records Reviewed: Nursing Notes and Old Medical Records     Procedures: None   Procedures    Provider Notes (Medical Decision Making):     Differential Diagnosis:  Candidiasis, trichomoniasis, bacterial vaginitis, GC/Chlamydia, pregnancy, urethritis/UTI, vaginal foreign body, atrophic vaginitis, contact vulvovaginitis, physiologic discharge    Plan: Will order ua, urine preg, patient will self swab with wet prep and gc swab.    9:16 AM  Have advised patient to start a prenatal vitamin and follow-up closely with her OB/GYN. Did discuss positive pregnancy. Patient is a . Also advised I&D of Bartholin cyst however patient adamantly denies. States she would prefer to try a course of antibiotics and sitz bath's first.  Have advised patient to return in 2 days if no improvement or follow-up with OB/GYN. MED RECONCILIATION:  No current facility-administered medications for this encounter. Current Outpatient Medications   Medication Sig    cephALEXin (Keflex) 500 mg capsule Take 1 Capsule by mouth four (4) times daily for 7 days.  medroxyPROGESTERone (DEPO-PROVERA) 150 mg/mL injection INJECT 1 ML ONCE FOR 1 DOSE. REPEAT IN 3 MONTHS. TO BE ADMINISTERED BY NURSING.  etodolac (LODINE) 400 mg tablet Take 400 mg by mouth two (2) times daily (with meals).     diphenoxylate-atropine (LOMOTIL) 2.5-0.025 mg per tablet Take 1 Tab by mouth four (4) times daily as needed for Diarrhea. Max Daily Amount: 4 Tabs.  ondansetron hcl (ZOFRAN, AS HYDROCHLORIDE,) 4 mg tablet Take 2 Tabs by mouth every eight (8) hours as needed for Nausea.  dextroamphetamine-amphetamine (ADDERALL) 10 mg tablet Take 10 mg by mouth.  OXcarbazepine (TRILEPTAL) 150 mg tablet Take 150 mg by mouth three (3) times daily.  traZODone (DESYREL) 50 mg tablet Take  by mouth nightly. Disposition:  Home     DISCHARGE NOTE:   Pt has been reexamined. Patient has no new complaints, changes, or physical findings. Care plan outlined and precautions discussed. Results of workup were reviewed with the patient. All medications were reviewed with the patient. All of pt's questions and concerns were addressed. Patient was instructed and agrees to follow up with PCP/OB/GYN as well as to return to the ED upon further deterioration. Patient is ready to go home. Follow-up Information     Follow up With Specialties Details Why Contact Info    [facility] EMERGENCY DEPT Emergency Medicine  As needed [address]  [phone]    Amita Ward MD Family Medicine Schedule an appointment as soon as possible for a visit   [address] [facility]  [address]  [phone]  [phone]   Schedule an appointment as soon as possible for a visit   [address] [facility]  [phone]          Current Discharge Medication List      START taking these medications    Details   cephALEXin (Keflex) 500 mg capsule Take 1 Capsule by mouth four (4) times daily for 7 days. Qty: 28 Capsule, Refills: 0  Start date: 7/13/2022, End date: 7/20/2022                 Diagnosis     Clinical Impression:   1. Positive pregnancy test    2. Bartholin's cyst          \"Please note that this dictation was completed with [employer], the [employer] voice recognition software.  Quite often unanticipated grammatical, syntax, homophones, and other interpretive errors are inadvertently transcribed by the computer software. Please disregard these errors. Please excuse any errors that have escaped final proofreading. \"

## 2022-07-13 NOTE — Clinical Note
[address] [place] [address] Dr [facility] EMERGENCY DEPT  [address] [address] [phone]    Work/School Note    Date: 7/13/2022    To Whom It May concern:      Mirella May was seen and treated today in the emergency room by the following provider(s):  Attending Provider: Antwon Charles MD  Physician Assistant: MERCEDES Stephens. Mirella May is excused from work/school on 07/13/22. She is clear to return to work/school on 07/14/22.         Sincerely,          MERCEDES Dobson

## 2022-07-16 ENCOUNTER — HOSPITAL ENCOUNTER (EMERGENCY)
Age: 27
Discharge: HOME | End: 2022-07-16
Attending: STUDENT IN AN ORGANIZED HEALTH CARE EDUCATION/TRAINING PROGRAM
Payer: MEDICAID

## 2022-07-16 ENCOUNTER — APPOINTMENT (OUTPATIENT)
Dept: ULTRASOUND IMAGING | Age: 27
End: 2022-07-16
Attending: PHYSICIAN ASSISTANT
Payer: MEDICAID

## 2022-07-16 VITALS
HEART RATE: 75 BPM | OXYGEN SATURATION: 99 % | RESPIRATION RATE: 18 BRPM | TEMPERATURE: 98.8 F | BODY MASS INDEX: 27.32 KG/M2 | SYSTOLIC BLOOD PRESSURE: 118 MMHG | WEIGHT: 154.2 LBS | DIASTOLIC BLOOD PRESSURE: 73 MMHG | HEIGHT: 63 IN

## 2022-07-16 DIAGNOSIS — O36.80X0 PREGNANCY OF UNKNOWN ANATOMIC LOCATION: Primary | ICD-10-CM

## 2022-07-16 LAB
ALBUMIN SERPL-MCNC: 3.6 G/DL (ref 3.4–5)
ALBUMIN/GLOB SERPL: 0.9 {RATIO} (ref 0.8–1.7)
ALP SERPL-CCNC: 73 U/L (ref 45–117)
ALT SERPL-CCNC: 38 U/L (ref 13–56)
ANION GAP SERPL CALC-SCNC: 5 MMOL/L (ref 3–18)
APPEARANCE UR: CLEAR
AST SERPL-CCNC: 24 U/L (ref 10–38)
BASOPHILS # BLD: 0 K/UL (ref 0–0.1)
BASOPHILS NFR BLD: 0 % (ref 0–2)
BILIRUB SERPL-MCNC: 0.8 MG/DL (ref 0.2–1)
BILIRUB UR QL: NEGATIVE
BUN SERPL-MCNC: 9 MG/DL (ref 7–18)
BUN/CREAT SERPL: 10 (ref 12–20)
CALCIUM SERPL-MCNC: 8.8 MG/DL (ref 8.5–10.1)
CHLORIDE SERPL-SCNC: 108 MMOL/L (ref 100–111)
CO2 SERPL-SCNC: 24 MMOL/L (ref 21–32)
COLOR UR: YELLOW
CREAT SERPL-MCNC: 0.87 MG/DL (ref 0.6–1.3)
DIFFERENTIAL METHOD BLD: NORMAL
EOSINOPHIL # BLD: 0.1 K/UL (ref 0–0.4)
EOSINOPHIL NFR BLD: 2 % (ref 0–5)
ERYTHROCYTE [DISTWIDTH] IN BLOOD BY AUTOMATED COUNT: 14 % (ref 11.6–14.5)
GLOBULIN SER CALC-MCNC: 4.1 G/DL (ref 2–4)
GLUCOSE SERPL-MCNC: 93 MG/DL (ref 74–99)
GLUCOSE UR STRIP.AUTO-MCNC: NEGATIVE MG/DL
HCG SERPL-ACNC: 1338 MIU/ML (ref 0–10)
HCT VFR BLD AUTO: 39.2 % (ref 35–45)
HGB BLD-MCNC: 13.3 G/DL (ref 12–16)
HGB UR QL STRIP: NEGATIVE
IMM GRANULOCYTES # BLD AUTO: 0 K/UL (ref 0–0.04)
IMM GRANULOCYTES NFR BLD AUTO: 0 % (ref 0–0.5)
KETONES UR QL STRIP.AUTO: 15 MG/DL
LEUKOCYTE ESTERASE UR QL STRIP.AUTO: NEGATIVE
LYMPHOCYTES # BLD: 2.9 K/UL (ref 0.9–3.6)
LYMPHOCYTES NFR BLD: 35 % (ref 21–52)
MAGNESIUM SERPL-MCNC: 2.1 MG/DL (ref 1.6–2.6)
MCH RBC QN AUTO: 29.4 PG (ref 24–34)
MCHC RBC AUTO-ENTMCNC: 33.9 G/DL (ref 31–37)
MCV RBC AUTO: 86.5 FL (ref 78–100)
MONOCYTES # BLD: 0.6 K/UL (ref 0.05–1.2)
MONOCYTES NFR BLD: 7 % (ref 3–10)
NEUTS SEG # BLD: 4.6 K/UL (ref 1.8–8)
NEUTS SEG NFR BLD: 56 % (ref 40–73)
NITRITE UR QL STRIP.AUTO: NEGATIVE
NRBC # BLD: 0 K/UL (ref 0–0.01)
NRBC BLD-RTO: 0 PER 100 WBC
PH UR STRIP: 6 [PH] (ref 5–8)
PLATELET # BLD AUTO: 287 K/UL (ref 135–420)
PMV BLD AUTO: 10.1 FL (ref 9.2–11.8)
POTASSIUM SERPL-SCNC: 3.9 MMOL/L (ref 3.5–5.5)
PROT SERPL-MCNC: 7.7 G/DL (ref 6.4–8.2)
PROT UR STRIP-MCNC: NEGATIVE MG/DL
RBC # BLD AUTO: 4.53 M/UL (ref 4.2–5.3)
SODIUM SERPL-SCNC: 137 MMOL/L (ref 136–145)
SP GR UR REFRACTOMETRY: >1.03 (ref 1–1.03)
UROBILINOGEN UR QL STRIP.AUTO: 1 EU/DL (ref 0.2–1)
WBC # BLD AUTO: 8.3 K/UL (ref 4.6–13.2)

## 2022-07-16 PROCEDURE — 84702 CHORIONIC GONADOTROPIN TEST: CPT

## 2022-07-16 PROCEDURE — 99284 EMERGENCY DEPT VISIT MOD MDM: CPT

## 2022-07-16 PROCEDURE — 76817 TRANSVAGINAL US OBSTETRIC: CPT

## 2022-07-16 PROCEDURE — 83735 ASSAY OF MAGNESIUM: CPT

## 2022-07-16 PROCEDURE — 80053 COMPREHEN METABOLIC PANEL: CPT

## 2022-07-16 PROCEDURE — 85025 COMPLETE CBC W/AUTO DIFF WBC: CPT

## 2022-07-16 PROCEDURE — 81003 URINALYSIS AUTO W/O SCOPE: CPT

## 2022-07-16 RX ORDER — FOLIC ACID/MULTIVIT,IRON,MINER 0.4MG-18MG
1 TABLET ORAL DAILY
Qty: 30 TABLET | Refills: 0 | Status: SHIPPED | OUTPATIENT
Start: 2022-07-16

## 2022-07-16 NOTE — DISCHARGE INSTRUCTIONS
Take medication as prescribed. Follow-up with your obstetrician within 1 week for reassessment, repeat labs and repeat ultrasound. Bring the results from this visit with you for their review. Return to the ED immediately for any new, worsening, or persistent symptoms, including vaginal bleeding, abdominal pain, or any other medical concerns.

## 2022-07-16 NOTE — ED PROVIDER NOTES
EMERGENCY DEPARTMENT HISTORY AND PHYSICAL EXAM    3:54 PM      Date: 2022  Patient Name: Arsh Soto    History of Presenting Illness     Chief Complaint   Patient presents with    Pregnancy Problem    Abdominal Pain       History Provided By: Patient    Additional History (Context): Arsh Soto is a 31 y.o. female with hx of depression, bipolar d/o, and other noted PMH who presents with complaint of lower abdominal pain x 1 day. Patient notes positive pregnancy test in the ED on . , unknown last menstrual cycle. Pt notes nausea and vomiting. Denies fever/chills, chest pain, dyspnea, dysuria, hematuria, vaginal bleeding. Notes vaginal discharge for which she was evaluated on . PCP: None    Current Outpatient Medications   Medication Sig Dispense Refill    prenatal vitamin (Prenatal) 28 mg iron- 800 mcg tab tablet Take 1 Tablet by mouth daily. 30 Tablet 0    OXcarbazepine (TRILEPTAL) 150 mg tablet Take 150 mg by mouth three (3) times daily.  traZODone (DESYREL) 50 mg tablet Take  by mouth nightly. Past History     Past Medical History:  Past Medical History:   Diagnosis Date    Bipolar 1 disorder ([facility] [address]     Bipolar 2 disorder ([facility] [address] 8/3/2010    Borderline personality disorder ([facility] [address]     Depression 8/3/2010    Major depression     Psychiatric disorder     Psychosis ([facility] [address]     PTSD (post-traumatic stress disorder) 8/3/2010    PTSD (post-traumatic stress disorder)     Schizophrenia ([facility] [address]        Past Surgical History:  No past surgical history on file.     Family History:  Family History   Problem Relation Age of Onset    Cancer Maternal Grandfather         throat cancer    Hypertension Maternal Uncle     Diabetes Maternal Grandmother     Hypertension Maternal Grandmother     Cancer Maternal Grandmother         cervical cancer    Diabetes Paternal Grandmother     Hypertension Paternal Grandfather        Social History:  Social History     Tobacco Use    Smoking status: Current Every Day Smoker     Packs/day: 0.25     Years: 1.00     Pack years: 0.25     Types: Cigarettes    Smokeless tobacco: Never Used   Substance Use Topics    Alcohol use: Yes     Alcohol/week: 0.0 standard drinks     Comment: occasionally    Drug use: Yes     Frequency: 7.0 times per week     Types: Marijuana     Comment: last used cocaine 6/2010       Allergies:  No Known Allergies      Review of Systems       Review of Systems   Constitutional: Negative for chills and fever. Respiratory: Negative for shortness of breath. Cardiovascular: Negative for chest pain. Gastrointestinal: Positive for abdominal pain. Negative for nausea and vomiting. Genitourinary: Positive for vaginal discharge. Skin: Negative for rash. Neurological: Negative for weakness. All other systems reviewed and are negative. Physical Exam     Visit Vitals  /73   Pulse 75   Temp 98.8 °F (37.1 °C)   Resp 18   Ht 5' 3\" (1.6 m)   Wt 69.9 kg (154 lb 3.2 oz)   SpO2 99%   BMI 27.32 kg/m²         Physical Exam  Vitals and nursing note reviewed. Constitutional:       General: She is not in acute distress. Appearance: She is well-developed. She is not ill-appearing, toxic-appearing or diaphoretic. HENT:      Head: Normocephalic and atraumatic. Cardiovascular:      Rate and Rhythm: Normal rate and regular rhythm. Heart sounds: Normal heart sounds. No murmur heard. No friction rub. No gallop. Pulmonary:      Effort: Pulmonary effort is normal. No respiratory distress. Breath sounds: Normal breath sounds. No wheezing or rales. Abdominal:      General: Abdomen is flat. Bowel sounds are normal.      Palpations: Abdomen is soft. Tenderness: There is no abdominal tenderness. There is no right CVA tenderness, left CVA tenderness, guarding or rebound. Musculoskeletal:         General: Normal range of motion. Cervical back: Normal range of motion and neck supple.    Skin:     General: Skin is warm. Findings: No rash. Neurological:      Mental Status: She is alert. Diagnostic Study Results     Labs -  Recent Results (from the past 12 hour(s))   CBC WITH AUTOMATED DIFF    Collection Time: 07/16/22  3:47 PM   Result Value Ref Range    WBC 8.3 4.6 - 13.2 K/uL    RBC 4.53 4.20 - 5.30 M/uL    HGB 13.3 12.0 - 16.0 g/dL    HCT 39.2 35.0 - 45.0 %    MCV 86.5 78.0 - 100.0 FL    MCH 29.4 24.0 - 34.0 PG    MCHC 33.9 31.0 - 37.0 g/dL    RDW 14.0 11.6 - 14.5 %    PLATELET 799 101 - 802 K/uL    MPV 10.1 9.2 - 11.8 FL    NRBC 0.0 0  WBC    ABSOLUTE NRBC 0.00 0.00 - 0.01 K/uL    NEUTROPHILS 56 40 - 73 %    LYMPHOCYTES 35 21 - 52 %    MONOCYTES 7 3 - 10 %    EOSINOPHILS 2 0 - 5 %    BASOPHILS 0 0 - 2 %    IMMATURE GRANULOCYTES 0 0.0 - 0.5 %    ABS. NEUTROPHILS 4.6 1.8 - 8.0 K/UL    ABS. LYMPHOCYTES 2.9 0.9 - 3.6 K/UL    ABS. MONOCYTES 0.6 0.05 - 1.2 K/UL    ABS. EOSINOPHILS 0.1 0.0 - 0.4 K/UL    ABS. BASOPHILS 0.0 0.0 - 0.1 K/UL    ABS. IMM. GRANS. 0.0 0.00 - 0.04 K/UL    DF AUTOMATED     METABOLIC PANEL, COMPREHENSIVE    Collection Time: 07/16/22  3:47 PM   Result Value Ref Range    Sodium 137 136 - 145 mmol/L    Potassium 3.9 3.5 - 5.5 mmol/L    Chloride 108 100 - 111 mmol/L    CO2 24 21 - 32 mmol/L    Anion gap 5 3.0 - 18 mmol/L    Glucose 93 74 - 99 mg/dL    BUN 9 7.0 - 18 MG/DL    Creatinine 0.87 0.6 - 1.3 MG/DL    BUN/Creatinine ratio 10 (L) 12 - 20      GFR est AA >60 >60 ml/min/1.73m2    GFR est non-AA >60 >60 ml/min/1.73m2    Calcium 8.8 8.5 - 10.1 MG/DL    Bilirubin, total 0.8 0.2 - 1.0 MG/DL    ALT (SGPT) 38 13 - 56 U/L    AST (SGOT) 24 10 - 38 U/L    Alk.  phosphatase 73 45 - 117 U/L    Protein, total 7.7 6.4 - 8.2 g/dL    Albumin 3.6 3.4 - 5.0 g/dL    Globulin 4.1 (H) 2.0 - 4.0 g/dL    A-G Ratio 0.9 0.8 - 1.7     BETA HCG, QT    Collection Time: 07/16/22  3:47 PM   Result Value Ref Range    Beta HCG, QT 1,338 (H) 0 - 10 MIU/ML   MAGNESIUM    Collection Time: 07/16/22  3:47 PM Result Value Ref Range    Magnesium 2.1 1.6 - 2.6 mg/dL   URINALYSIS W/ RFLX MICROSCOPIC    Collection Time: 07/16/22  3:52 PM   Result Value Ref Range    Color YELLOW      Appearance CLEAR      Specific gravity >1.030 (H) 1.005 - 1.030    pH (UA) 6.0 5.0 - 8.0      Protein Negative NEG mg/dL    Glucose Negative NEG mg/dL    Ketone 15 (A) NEG mg/dL    Bilirubin Negative NEG      Blood Negative NEG      Urobilinogen 1.0 0.2 - 1.0 EU/dL    Nitrites Negative NEG      Leukocyte Esterase Negative NEG       Radiologic Studies -   US OB TV W DOPPLER   Final Result   1. A tiny fluid collection along the endometrium could represent a small   gestational sac, but this is unconfirmed as a fetal pole or yolk sac are not yet   visualized. -Please note that in the setting of positive pregnancy test and unconfirmed   intrauterine gestation, an ectopic pregnancy cannot be excluded. -Recommend short interval follow-up ultrasound and trending of beta hCG for   further evaluation. 2. Cystic structure in the right ovary, possibly corpus luteum. Medical Decision Making   I am the first provider for this patient. I reviewed the vital signs, available nursing notes, past medical history, past surgical history, family history and social history. Vital Signs-Reviewed the patient's vital signs. Records Reviewed: Nursing Notes and Old Medical Records (Time of Review: 3:54 PM)    ED Course: Progress Notes, Reevaluation, and Consults:  5:20 PM: Reviewed results and plan with patient. Discussed need for close outpatient follow-up this week for reassessment, notes she has obstetrician for outpatient follow-up on 7/26. Discussed strict return precautions, including fever, vomiting, vaginal bleeding, or any other medical concerns. Pt in agreement with plan. Printed US report for her records.     Provider Notes (Medical Decision Making): 71-year-old female who presents to ED due to lower abdominal pain x1 day, positive pregnancy test.  Afebrile, nontoxic-appearing, looks well. Labs essentially unremarkable, hCG 1338. Transvaginal ultrasound demonstrates tiny fluid collection that could represent a small gestational sac. Discussed importance of close outpatient follow-up this week for reassessment, repeat hCG and ultrasound to trend progression of pregnancy. Strict return precautions provided    Diagnosis     Clinical Impression:   1. Pregnancy of unknown anatomic location        Disposition: home     Follow-up Information     Follow up With Specialties Details Why [address] [facility]    [facility] EMERGENCY DEPT Emergency Medicine  If symptoms worsen [address] [address]  [facility] Gynecology  Schedule an appointment as soon as possible for a visit   [address] [facility] Obstetrics And Gynecology Obstetrics & Gynecology, Obstetrics Schedule an appointment as soon as possible for a visit   [address] [facility] [address] [phone]           Patient's Medications   Start Taking    PRENATAL VITAMIN (PRENATAL) 28 MG IRON- 800 MCG TAB TABLET    Take 1 Tablet by mouth daily. Continue Taking    OXCARBAZEPINE (TRILEPTAL) 150 MG TABLET    Take 150 mg by mouth three (3) times daily. TRAZODONE (DESYREL) 50 MG TABLET    Take  by mouth nightly. These Medications have changed    No medications on file   Stop Taking    CEPHALEXIN (KEFLEX) 500 MG CAPSULE    Take 1 Capsule by mouth four (4) times daily for 7 days. DEXTROAMPHETAMINE-AMPHETAMINE (ADDERALL) 10 MG TABLET    Take 10 mg by mouth. DIPHENOXYLATE-ATROPINE (LOMOTIL) 2.5-0.025 MG PER TABLET    Take 1 Tab by mouth four (4) times daily as needed for Diarrhea. Max Daily Amount: 4 Tabs. ETODOLAC (LODINE) 400 MG TABLET    Take 400 mg by mouth two (2) times daily (with meals). MEDROXYPROGESTERONE (DEPO-PROVERA) 150 MG/ML INJECTION    INJECT 1 ML ONCE FOR 1 DOSE. REPEAT IN 3 MONTHS. TO BE ADMINISTERED BY NURSING. ONDANSETRON HCL (ZOFRAN, AS HYDROCHLORIDE,) 4 MG TABLET    Take 2 Tabs by mouth every eight (8) hours as needed for Nausea. Dictation disclaimer:  Please note that this dictation was completed with [employer], the computer voice recognition software. Quite often unanticipated grammatical, syntax, homophones, and other interpretive errors are inadvertently transcribed by the computer software. Please disregard these errors. Please excuse any errors that have escaped final proofreading.

## 2022-07-16 NOTE — Clinical Note
[address] [place] [address] Dr [facility] EMERGENCY DEPT  [address] [address] [phone]    Work/School Note    Date: 7/16/2022    To Whom It May concern:      Kia Ocampo was seen and treated today in the emergency room by the following provider(s):  Attending Provider: Earline Ortega DO  Physician Assistant: Warner Mallory. Kia Ocampo is excused from work/school on 07/16/22. She is clear to return to work/school on 07/17/22.         Sincerely,          MERCEDES Fay

## 2022-07-20 ENCOUNTER — APPOINTMENT (OUTPATIENT)
Dept: ULTRASOUND IMAGING | Age: 27
End: 2022-07-20
Attending: PHYSICIAN ASSISTANT
Payer: MEDICAID

## 2022-07-20 ENCOUNTER — HOSPITAL ENCOUNTER (EMERGENCY)
Age: 27
Discharge: HOME | End: 2022-07-21
Attending: STUDENT IN AN ORGANIZED HEALTH CARE EDUCATION/TRAINING PROGRAM
Payer: MEDICAID

## 2022-07-20 VITALS
WEIGHT: 153 LBS | HEIGHT: 55 IN | TEMPERATURE: 98.5 F | HEART RATE: 68 BPM | RESPIRATION RATE: 16 BRPM | OXYGEN SATURATION: 99 % | DIASTOLIC BLOOD PRESSURE: 73 MMHG | SYSTOLIC BLOOD PRESSURE: 107 MMHG | BODY MASS INDEX: 35.41 KG/M2

## 2022-07-20 DIAGNOSIS — O46.8X1 SUBCHORIONIC HEMORRHAGE OF PLACENTA IN FIRST TRIMESTER, FETUS 1 OF MULTIPLE GESTATION: ICD-10-CM

## 2022-07-20 DIAGNOSIS — O41.8X11 SUBCHORIONIC HEMORRHAGE OF PLACENTA IN FIRST TRIMESTER, FETUS 1 OF MULTIPLE GESTATION: ICD-10-CM

## 2022-07-20 DIAGNOSIS — Z67.91 RH NEGATIVE STATUS DURING PREGNANCY IN FIRST TRIMESTER: ICD-10-CM

## 2022-07-20 DIAGNOSIS — O26.891 RH NEGATIVE STATUS DURING PREGNANCY IN FIRST TRIMESTER: ICD-10-CM

## 2022-07-20 DIAGNOSIS — O20.0 THREATENED MISCARRIAGE IN EARLY PREGNANCY: Primary | ICD-10-CM

## 2022-07-20 LAB
APPEARANCE UR: ABNORMAL
BACTERIA URNS QL MICRO: ABNORMAL /HPF
BILIRUB UR QL: NEGATIVE
COLOR UR: ABNORMAL
EPITH CASTS URNS QL MICRO: ABNORMAL /LPF (ref 0–5)
GLUCOSE UR STRIP.AUTO-MCNC: NEGATIVE MG/DL
HGB UR QL STRIP: ABNORMAL
KETONES UR QL STRIP.AUTO: ABNORMAL MG/DL
LEUKOCYTE ESTERASE UR QL STRIP.AUTO: ABNORMAL
NITRITE UR QL STRIP.AUTO: NEGATIVE
PH UR STRIP: 5.5 [PH] (ref 5–8)
PROT UR STRIP-MCNC: 30 MG/DL
RBC #/AREA URNS HPF: ABNORMAL /HPF (ref 0–5)
SP GR UR REFRACTOMETRY: 1.03 (ref 1–1.03)
UROBILINOGEN UR QL STRIP.AUTO: 1 EU/DL (ref 0.2–1)
WBC URNS QL MICRO: ABNORMAL /HPF (ref 0–4)

## 2022-07-20 PROCEDURE — 81001 URINALYSIS AUTO W/SCOPE: CPT

## 2022-07-20 PROCEDURE — 80053 COMPREHEN METABOLIC PANEL: CPT

## 2022-07-20 PROCEDURE — 85025 COMPLETE CBC W/AUTO DIFF WBC: CPT

## 2022-07-20 PROCEDURE — 84702 CHORIONIC GONADOTROPIN TEST: CPT

## 2022-07-20 PROCEDURE — 86900 BLOOD TYPING SEROLOGIC ABO: CPT

## 2022-07-20 PROCEDURE — 86850 RBC ANTIBODY SCREEN: CPT

## 2022-07-20 PROCEDURE — 99284 EMERGENCY DEPT VISIT MOD MDM: CPT

## 2022-07-20 PROCEDURE — 76817 TRANSVAGINAL US OBSTETRIC: CPT

## 2022-07-20 PROCEDURE — 87086 URINE CULTURE/COLONY COUNT: CPT

## 2022-07-20 PROCEDURE — 84703 CHORIONIC GONADOTROPIN ASSAY: CPT

## 2022-07-21 LAB
ABO + RH BLD: NORMAL
ALBUMIN SERPL-MCNC: 3.6 G/DL (ref 3.4–5)
ALBUMIN/GLOB SERPL: 0.9 {RATIO} (ref 0.8–1.7)
ALP SERPL-CCNC: 63 U/L (ref 45–117)
ALT SERPL-CCNC: 40 U/L (ref 13–56)
ANION GAP SERPL CALC-SCNC: 7 MMOL/L (ref 3–18)
AST SERPL-CCNC: 24 U/L (ref 10–38)
BASOPHILS # BLD: 0.1 K/UL (ref 0–0.1)
BASOPHILS NFR BLD: 1 % (ref 0–2)
BILIRUB SERPL-MCNC: 0.4 MG/DL (ref 0.2–1)
BLD PROD TYP BPU: NORMAL
BLOOD GROUP ANTIBODIES SERPL: NORMAL
BPU ID: NORMAL
BUN SERPL-MCNC: 15 MG/DL (ref 7–18)
BUN/CREAT SERPL: 19 (ref 12–20)
CALCIUM SERPL-MCNC: 8.8 MG/DL (ref 8.5–10.1)
CALLED TO:,BCALL1: NORMAL
CHLORIDE SERPL-SCNC: 106 MMOL/L (ref 100–111)
CO2 SERPL-SCNC: 23 MMOL/L (ref 21–32)
CREAT SERPL-MCNC: 0.77 MG/DL (ref 0.6–1.3)
DIFFERENTIAL METHOD BLD: ABNORMAL
EOSINOPHIL # BLD: 0.2 K/UL (ref 0–0.4)
EOSINOPHIL NFR BLD: 3 % (ref 0–5)
ERYTHROCYTE [DISTWIDTH] IN BLOOD BY AUTOMATED COUNT: 13.9 % (ref 11.6–14.5)
GA (WEEKS): NORMAL WK
GLOBULIN SER CALC-MCNC: 3.8 G/DL (ref 2–4)
GLUCOSE SERPL-MCNC: 103 MG/DL (ref 74–99)
HCG SERPL QL: POSITIVE
HCG SERPL-ACNC: 6380 MIU/ML (ref 0–10)
HCT VFR BLD AUTO: 38.3 % (ref 35–45)
HGB BLD-MCNC: 13 G/DL (ref 12–16)
IMM GRANULOCYTES # BLD AUTO: 0 K/UL (ref 0–0.04)
IMM GRANULOCYTES NFR BLD AUTO: 0 % (ref 0–0.5)
LYMPHOCYTES # BLD: 3.8 K/UL (ref 0.9–3.6)
LYMPHOCYTES NFR BLD: 40 % (ref 21–52)
MCH RBC QN AUTO: 29.3 PG (ref 24–34)
MCHC RBC AUTO-ENTMCNC: 33.9 G/DL (ref 31–37)
MCV RBC AUTO: 86.3 FL (ref 78–100)
MONOCYTES # BLD: 0.7 K/UL (ref 0.05–1.2)
MONOCYTES NFR BLD: 7 % (ref 3–10)
NEUTS SEG # BLD: 4.7 K/UL (ref 1.8–8)
NEUTS SEG NFR BLD: 50 % (ref 40–73)
NRBC # BLD: 0 K/UL (ref 0–0.01)
NRBC BLD-RTO: 0 PER 100 WBC
PLATELET # BLD AUTO: 295 K/UL (ref 135–420)
PMV BLD AUTO: 10.3 FL (ref 9.2–11.8)
POTASSIUM SERPL-SCNC: 3.6 MMOL/L (ref 3.5–5.5)
PROT SERPL-MCNC: 7.4 G/DL (ref 6.4–8.2)
RBC # BLD AUTO: 4.44 M/UL (ref 4.2–5.3)
SODIUM SERPL-SCNC: 136 MMOL/L (ref 136–145)
STATUS OF UNIT,%ST: NORMAL
UNIT DIVISION, %UDIV: 0
WBC # BLD AUTO: 9.4 K/UL (ref 4.6–13.2)

## 2022-07-21 NOTE — ED PROVIDER NOTES
EMERGENCY DEPARTMENT HISTORY AND PHYSICAL EXAM      Date: 2022  Patient Name: Zen Quinonez    History of Presenting Illness     Chief Complaint   Patient presents with    Vaginal Bleeding       History Provided By: Patient    HPI: Zen Quinonez, 31 y.o. female PMHx significant for bipolar disorder, PTSD, nephronia, borderline personality disorder presents ambulatory to the ED. patient reports she believes she is about 45 weeks pregnant. Unsure of last menstrual cycle. . There are no other complaints, changes, or physical findings at this time. PCP: None    No current facility-administered medications on file prior to encounter. Current Outpatient Medications on File Prior to Encounter   Medication Sig Dispense Refill    prenatal vitamin (Prenatal) 28 mg iron- 800 mcg tab tablet Take 1 Tablet by mouth daily. (Patient not taking: Reported on 2022) 30 Tablet 0    OXcarbazepine (TRILEPTAL) 150 mg tablet Take 150 mg by mouth three (3) times daily. (Patient not taking: Reported on 2022)      traZODone (DESYREL) 50 mg tablet Take  by mouth nightly. Past History     Past Medical History:  Past Medical History:   Diagnosis Date    Bipolar 1 disorder ([address]     Bipolar 2 disorder ([address] 8/3/2010    Borderline personality disorder ([address]     Depression 8/3/2010    Major depression     Psychiatric disorder     Psychosis ([address]     PTSD (post-traumatic stress disorder) 8/3/2010    PTSD (post-traumatic stress disorder)     Schizophrenia ([facility]        Past Surgical History:  History reviewed. No pertinent surgical history.     Family History:  Family History   Problem Relation Age of Onset    Cancer Maternal Grandfather         throat cancer    Hypertension Maternal Uncle     Diabetes Maternal Grandmother     Hypertension Maternal Grandmother     Cancer Maternal Grandmother         cervical cancer    Diabetes Paternal Grandmother     Hypertension Paternal Grandfather        Social History:  Social History     Tobacco Use    Smoking status: Every Day     Packs/day: 0.25     Years: 1.00     Pack years: 0.25     Types: Cigarettes    Smokeless tobacco: Never   Substance Use Topics    Alcohol use: Yes     Alcohol/week: 0.0 standard drinks     Comment: occasionally    Drug use: Yes     Frequency: 7.0 times per week     Types: Marijuana     Comment: last used cocaine 6/2010       Allergies:  No Known Allergies      Review of Systems   Review of Systems   Constitutional:  Negative for chills and fever. HENT:  Negative for sneezing. Respiratory:  Negative for shortness of breath. Cardiovascular:  Negative for chest pain. Gastrointestinal:  Positive for abdominal pain. Negative for nausea and vomiting. Genitourinary:  Positive for vaginal bleeding. Negative for flank pain. Musculoskeletal:  Negative for back pain and myalgias. Skin:  Negative for color change, pallor, rash and wound. Neurological:  Negative for dizziness, weakness and light-headedness. All other systems reviewed and are negative. Physical Exam   Physical Exam  Vitals and nursing note reviewed. Constitutional:       General: She is not in acute distress. Appearance: She is well-developed. Comments: Pt in NAD   HENT:      Head: Normocephalic and atraumatic. Eyes:      Conjunctiva/sclera: Conjunctivae normal.   Cardiovascular:      Rate and Rhythm: Normal rate and regular rhythm. Heart sounds: Normal heart sounds. Pulmonary:      Effort: Pulmonary effort is normal. No respiratory distress. Breath sounds: Normal breath sounds. Abdominal:      General: Bowel sounds are normal. There is no distension. Palpations: Abdomen is soft. Tenderness: There is abdominal tenderness in the suprapubic area. Comments: Abdomen soft  Nondistender  No guarding or rigidity   Musculoskeletal:         General: Normal range of motion. Skin:     General: Skin is warm. Findings: No rash.    Neurological: Mental Status: She is alert and oriented to person, place, and time. Psychiatric:         Behavior: Behavior normal.       Diagnostic Study Results     Labs -     Recent Results (from the past 12 hour(s))   URINALYSIS W/ RFLX MICROSCOPIC    Collection Time: 07/20/22 10:40 PM   Result Value Ref Range    Color DARK YELLOW      Appearance CLOUDY      Specific gravity 1.029 1.003 - 1.030      pH (UA) 5.5 5.0 - 8.0      Protein 30 (A) NEG mg/dL    Glucose Negative NEG mg/dL    Ketone TRACE (A) NEG mg/dL    Bilirubin Negative NEG      Blood LARGE (A) NEG      Urobilinogen 1.0 0.2 - 1.0 EU/dL    Nitrites Negative NEG      Leukocyte Esterase TRACE (A) NEG     URINE MICROSCOPIC ONLY    Collection Time: 07/20/22 10:40 PM   Result Value Ref Range    WBC 0 to 2 0 - 4 /hpf    RBC 10 to 15 0 - 5 /hpf    Epithelial cells 4+ 0 - 5 /lpf    Bacteria 2+ (A) NEG /hpf   CBC WITH AUTOMATED DIFF    Collection Time: 07/20/22 11:48 PM   Result Value Ref Range    WBC 9.4 4.6 - 13.2 K/uL    RBC 4.44 4.20 - 5.30 M/uL    HGB 13.0 12.0 - 16.0 g/dL    HCT 38.3 35.0 - 45.0 %    MCV 86.3 78.0 - 100.0 FL    MCH 29.3 24.0 - 34.0 PG    MCHC 33.9 31.0 - 37.0 g/dL    RDW 13.9 11.6 - 14.5 %    PLATELET 835 038 - 405 K/uL    MPV 10.3 9.2 - 11.8 FL    NRBC 0.0 0  WBC    ABSOLUTE NRBC 0.00 0.00 - 0.01 K/uL    NEUTROPHILS 50 40 - 73 %    LYMPHOCYTES 40 21 - 52 %    MONOCYTES 7 3 - 10 %    EOSINOPHILS 3 0 - 5 %    BASOPHILS 1 0 - 2 %    IMMATURE GRANULOCYTES 0 0.0 - 0.5 %    ABS. NEUTROPHILS 4.7 1.8 - 8.0 K/UL    ABS. LYMPHOCYTES 3.8 (H) 0.9 - 3.6 K/UL    ABS. MONOCYTES 0.7 0.05 - 1.2 K/UL    ABS. EOSINOPHILS 0.2 0.0 - 0.4 K/UL    ABS. BASOPHILS 0.1 0.0 - 0.1 K/UL    ABS. IMM.  GRANS. 0.0 0.00 - 0.04 K/UL    DF AUTOMATED     BLOOD TYPE, (ABO+RH)    Collection Time: 07/20/22 11:48 PM   Result Value Ref Range    ABO/Rh(D) O NEGATIVE     Antibody screen NEG    HCG QL SERUM    Collection Time: 07/20/22 11:48 PM   Result Value Ref Range    HCG, Ql. Positive (A) NEG     METABOLIC PANEL, COMPREHENSIVE    Collection Time: 07/20/22 11:48 PM   Result Value Ref Range    Sodium 136 136 - 145 mmol/L    Potassium 3.6 3.5 - 5.5 mmol/L    Chloride 106 100 - 111 mmol/L    CO2 23 21 - 32 mmol/L    Anion gap 7 3.0 - 18 mmol/L    Glucose 103 (H) 74 - 99 mg/dL    BUN 15 7.0 - 18 MG/DL    Creatinine 0.77 0.6 - 1.3 MG/DL    BUN/Creatinine ratio 19 12 - 20      GFR est AA >60 >60 ml/min/1.73m2    GFR est non-AA >60 >60 ml/min/1.73m2    Calcium 8.8 8.5 - 10.1 MG/DL    Bilirubin, total 0.4 0.2 - 1.0 MG/DL    ALT (SGPT) 40 13 - 56 U/L    AST (SGOT) 24 10 - 38 U/L    Alk. phosphatase 63 45 - 117 U/L    Protein, total 7.4 6.4 - 8.2 g/dL    Albumin 3.6 3.4 - 5.0 g/dL    Globulin 3.8 2.0 - 4.0 g/dL    A-G Ratio 0.9 0.8 - 1.7     BETA HCG, QT    Collection Time: 07/20/22 11:48 PM   Result Value Ref Range    Beta HCG, QT 6,380 (H) 0 - 10 MIU/ML   RH IMMUNE GLOBULIN PROPHYLACTIC    Collection Time: 07/21/22  2:15 AM   Result Value Ref Range    No. of weeks gestation 7 WEEKS     CALLED TO: IBRAHIMA IN ER AT 0225 ON [id number] BY [facility]     Unit number [id number]     Blood component type RH IMMUNE GLOBULIN     Unit division 00     Status of unit ALLOCATED        Radiologic Studies -   US OB TV W DOPPLER   Final Result      1. Continued progression of the likely IUP. The intrauterine cystic structure   which most likely represents a gestational sac continues to increase in size and   there is now questionable visualization of a yolk sac although no fetal pole is   definitively identified. Continued monitoring of beta hCG levels and follow-up   ultrasound is recommended to confirm normal progression of pregnancy. 2. Newly developed small subchorionic hemorrhage. 3. Stable right ovarian corpus luteal cyst.           CT Results  (Last 48 hours)      None          CXR Results  (Last 48 hours)      None            Medical Decision Making   I am the first provider for this patient.     I reviewed the vital signs, available nursing notes, past medical history, past surgical history, family history and social history. Vital Signs-Reviewed the patient's vital signs. Patient Vitals for the past 12 hrs:   Temp Pulse Resp BP SpO2   07/20/22 2338 98.5 °F (36.9 °C) 68 16 107/73 99 %   07/20/22 2214 98.2 °F (36.8 °C) 77 14 105/63 97 %       Records Reviewed: Nursing Notes, Old Medical Records, Previous Radiology Studies, and Previous Laboratory Studies    Provider Notes (Medical Decision Making):   DDx: Threatened vs Incomplete vs Complete miscarriage    30 yo F who presents with lower abdominal cramping and vaginal bleeding that started today. Currently 7 weeks pregnant. On exam, mild suprapubic tenderness. Rh negative. Requires rhogam because she is currently bleeding. Hgb stable. US shows small subchorionic hemorrhage with likely IUP. Discussed with patient importance of prompt OB follow-up for continued monitoring of hCG levels. At time of discharge, pt non-toxic appearing in NAD. Pt stable for prompt outpatient follow-up with PCP 1 to 2 days. Patient given strict instructions to return if symptoms worsen. ED Course:   Initial assessment performed. The patients presenting problems have been discussed, and they are in agreement with the care plan formulated and outlined with them. I have encouraged them to ask questions as they arise throughout their visit. Disposition:  Discussed lab and imaging results with pt along with dx and treatment plan. Discussed importance of PCP follow up. All questions answered. Pt voiced they understood. Return if sx worsen. PLAN:  1. Current Discharge Medication List        2.    Follow-up Information       Follow up With Specialties Details Why Contact Info    [facility], [address] [facility] Gynecology Schedule an appointment as soon as possible for a visit in 1 day  [address] [facility], [address]  [phone] [address]  Schedule an appointment as soon as possible for a visit in 1 day  [place] [address]  [phone]    [facility] EMERGENCY DEPT Emergency Medicine Schedule an appointment as soon as possible for a visit in 1 day As needed, If symptoms worsen [address] [place] [address]  [phone]          Return to ED if worse     Diagnosis     Clinical Impression:   1. Threatened miscarriage in early pregnancy    2. Rh negative status during pregnancy in first trimester    3. Subchorionic hemorrhage of placenta in first trimester, fetus 1 of multiple gestation        Attestations:    MERCEDES Corona    Please note that this dictation was completed with [employer], the computer voice recognition software. Quite often unanticipated grammatical, syntax, homophones, and other interpretive errors are inadvertently transcribed by the computer software. Please disregard these errors. Please excuse any errors that have escaped final proofreading. Thank you.

## 2022-07-22 ENCOUNTER — HOSPITAL ENCOUNTER (EMERGENCY)
Age: 27
Discharge: HOME | End: 2022-07-22
Attending: STUDENT IN AN ORGANIZED HEALTH CARE EDUCATION/TRAINING PROGRAM
Payer: MEDICAID

## 2022-07-22 VITALS
HEART RATE: 67 BPM | OXYGEN SATURATION: 100 % | BODY MASS INDEX: 27.11 KG/M2 | DIASTOLIC BLOOD PRESSURE: 77 MMHG | WEIGHT: 153 LBS | RESPIRATION RATE: 18 BRPM | SYSTOLIC BLOOD PRESSURE: 112 MMHG | HEIGHT: 63 IN | TEMPERATURE: 98 F

## 2022-07-22 DIAGNOSIS — O20.0 THREATENED MISCARRIAGE: Primary | ICD-10-CM

## 2022-07-22 LAB
APPEARANCE UR: ABNORMAL
BACTERIA SPEC CULT: NORMAL
BACTERIA URNS QL MICRO: ABNORMAL /HPF
BILIRUB UR QL: NEGATIVE
COLOR UR: YELLOW
EPITH CASTS URNS QL MICRO: ABNORMAL /LPF (ref 0–5)
GLUCOSE UR STRIP.AUTO-MCNC: NEGATIVE MG/DL
HCG SERPL-ACNC: ABNORMAL MIU/ML (ref 0–10)
HGB UR QL STRIP: ABNORMAL
KETONES UR QL STRIP.AUTO: NEGATIVE MG/DL
LEUKOCYTE ESTERASE UR QL STRIP.AUTO: ABNORMAL
NITRITE UR QL STRIP.AUTO: NEGATIVE
PH UR STRIP: 7 [PH] (ref 5–8)
PROT UR STRIP-MCNC: NEGATIVE MG/DL
RBC #/AREA URNS HPF: ABNORMAL /HPF (ref 0–5)
SERVICE CMNT-IMP: NORMAL
SP GR UR REFRACTOMETRY: 1.03 (ref 1–1.03)
UROBILINOGEN UR QL STRIP.AUTO: 1 EU/DL (ref 0.2–1)
WBC URNS QL MICRO: ABNORMAL /HPF (ref 0–4)

## 2022-07-22 PROCEDURE — 84702 CHORIONIC GONADOTROPIN TEST: CPT

## 2022-07-22 PROCEDURE — 99283 EMERGENCY DEPT VISIT LOW MDM: CPT

## 2022-07-22 PROCEDURE — 81001 URINALYSIS AUTO W/SCOPE: CPT

## 2022-07-22 RX ORDER — ACETAMINOPHEN 325 MG/1
650 TABLET ORAL
Qty: 20 TABLET | Refills: 0 | Status: SHIPPED | OUTPATIENT
Start: 2022-07-22 | End: 2022-10-12

## 2022-07-22 RX ORDER — ACETAMINOPHEN 500 MG
1000 TABLET ORAL
Status: DISCONTINUED | OUTPATIENT
Start: 2022-07-22 | End: 2022-07-22 | Stop reason: HOSPADM

## 2022-07-22 NOTE — ED PROVIDER NOTES
EMERGENCY DEPARTMENT HISTORY AND PHYSICAL EXAM    Date: 7/22/2022  Patient Name: Patti Valencia    History of Presenting Illness     Chief Complaint   Patient presents with    Pregnancy Problem         History Provided By: Patient    Chief Complaint: Vaginal bleeding, threatened miscarriage  Duration: Couple weeks  Timing: Worsening  Location: Vagina  Quality: Dark red  Severity: Moderate  Modifying Factors: None  Associated Symptoms: none       Additional History (Context): Patti Valencia is a 31 y.o. female with a history of bipolar disorder, PTSD and schizophrenia who is a G2, P0 presenting today roughly 8 weeks pregnant. Reports that she was seen and evaluated roughly 2 days ago and had an ultrasound that confirmed likely a continuing IUP. Reports for the past couple days she has been bleeding which started as bright red with clots and is now dark red. Reports she is having some cramping and is requesting advice on what she can take safely for this. Is taking a prenatal vitamin. Denies any urinary complaints. Reports that she has her first OB/GYN appointment in the next couple days. Denies concerns for std      PCP: None    Current Facility-Administered Medications   Medication Dose Route Frequency Provider Last Rate Last Admin    acetaminophen (TYLENOL) tablet 1,000 mg  1,000 mg Oral NOW Leonor Mclean PA         Current Outpatient Medications   Medication Sig Dispense Refill    acetaminophen (TYLENOL) 325 mg tablet Take 2 Tablets by mouth every four (4) hours as needed for Pain. 20 Tablet 0    prenatal vitamin (Prenatal) 28 mg iron- 800 mcg tab tablet Take 1 Tablet by mouth daily. (Patient not taking: Reported on 7/20/2022) 30 Tablet 0    OXcarbazepine (TRILEPTAL) 150 mg tablet Take 150 mg by mouth three (3) times daily. (Patient not taking: Reported on 7/20/2022)      traZODone (DESYREL) 50 mg tablet Take  by mouth nightly.          Past History     Past Medical History:  Past Medical History: Diagnosis Date    Bipolar 1 disorder ([facility]     Bipolar 2 disorder ([facility] 8/3/2010    Borderline personality disorder ([facility]     Depression 8/3/2010    Major depression     Psychiatric disorder     Psychosis ([facility]     PTSD (post-traumatic stress disorder) 8/3/2010    PTSD (post-traumatic stress disorder)     Schizophrenia ([facility]        Past Surgical History:  No past surgical history on file. Family History:  Family History   Problem Relation Age of Onset    Cancer Maternal Grandfather         throat cancer    Hypertension Maternal Uncle     Diabetes Maternal Grandmother     Hypertension Maternal Grandmother     Cancer Maternal Grandmother         cervical cancer    Diabetes Paternal Grandmother     Hypertension Paternal Grandfather        Social History:  Social History     Tobacco Use    Smoking status: Every Day     Packs/day: 0.25     Years: 1.00     Pack years: 0.25     Types: Cigarettes    Smokeless tobacco: Never   Substance Use Topics    Alcohol use: Yes     Alcohol/week: 0.0 standard drinks     Comment: occasionally    Drug use: Yes     Frequency: 7.0 times per week     Types: Marijuana     Comment: last used cocaine 6/2010       Allergies:  No Known Allergies      Review of Systems   Review of Systems   Constitutional:  Negative for chills and fever. HENT:  Negative for congestion, rhinorrhea and sore throat. Respiratory:  Negative for cough and shortness of breath. Cardiovascular:  Negative for chest pain. Gastrointestinal:  Negative for abdominal pain, blood in stool, constipation, diarrhea, nausea and vomiting. Genitourinary:  Positive for vaginal bleeding. Negative for dysuria, frequency and hematuria. Musculoskeletal:  Negative for back pain and myalgias. Skin:  Negative for rash and wound. Neurological:  Negative for dizziness and headaches. All other systems reviewed and are negative.   All Other Systems Negative  Physical Exam     Vitals:    07/22/22 1237 07/22/22 1240 07/22/22 1240 07/22/22 1241   BP: (!) 116/110  112/77    Pulse: 67      Resp: 18      Temp: 98 °F (36.7 °C)      SpO2: 100%      Weight:  69.4 kg (153 lb)     Height:    5' 3\" (1.6 m)     Physical Exam  Vitals and nursing note reviewed. Constitutional:       General: She is not in acute distress. Appearance: She is well-developed. She is not diaphoretic. HENT:      Head: Normocephalic and atraumatic. Eyes:      Conjunctiva/sclera: Conjunctivae normal.   Cardiovascular:      Rate and Rhythm: Normal rate and regular rhythm. Heart sounds: Normal heart sounds. Pulmonary:      Effort: Pulmonary effort is normal. No respiratory distress. Breath sounds: Normal breath sounds. Chest:      Chest wall: No tenderness. Abdominal:      General: Bowel sounds are normal. There is no distension. Palpations: Abdomen is soft. Tenderness: There is no abdominal tenderness. There is no guarding or rebound. Musculoskeletal:         General: No deformity. Cervical back: Normal range of motion and neck supple. Skin:     General: Skin is warm and dry. Neurological:      Mental Status: She is alert and oriented to person, place, and time. Deep Tendon Reflexes: Reflexes are normal and symmetric.          Diagnostic Study Results     Labs -     Recent Results (from the past 12 hour(s))   URINALYSIS W/ RFLX MICROSCOPIC    Collection Time: 07/22/22 12:45 PM   Result Value Ref Range    Color YELLOW      Appearance CLOUDY      Specific gravity 1.028 1.005 - 1.030      pH (UA) 7.0 5.0 - 8.0      Protein Negative NEG mg/dL    Glucose Negative NEG mg/dL    Ketone Negative NEG mg/dL    Bilirubin Negative NEG      Blood TRACE (A) NEG      Urobilinogen 1.0 0.2 - 1.0 EU/dL    Nitrites Negative NEG      Leukocyte Esterase TRACE (A) NEG     BETA HCG, QT    Collection Time: 07/22/22  1:04 PM   Result Value Ref Range    Beta HCG, QT 11,410 (H) 0 - 10 MIU/ML       Radiologic Studies -   No orders to display     CT Results  (Last 48 hours)      None          CXR Results  (Last 48 hours)      None              Medical Decision Making   I am the first provider for this patient. I reviewed the vital signs, available nursing notes, past medical history, past surgical history, family history and social history. Vital Signs-Reviewed the patient's vital signs. Records Reviewed: Nursing Notes and Old Medical Records     Procedures: None   Procedures    Provider Notes (Medical Decision Making):     Differential Diagnosis:  Menses, AUB/DUB, , normal pregnancy, missed miscarriage, normal bleeding in the first trimester     Plan: We will repeat beta quant and UA. Will order dose of Tylenol. Do not feel patient would benefit from repeat ultrasound at this time and she agrees. Have encouraged patient to continue prenatal vitamin and to keep her OB/GYN appointment as scheduled. Patient did receive RhoGAM at her last visit. 2:36 PM  Discussed beta quant results with patient. Have encouraged patient to keep her OB/GYN appointment as planned. Have encouraged pelvic rest.  Will discharge home with prescription for Tylenol as needed for pain. MED RECONCILIATION:  Current Facility-Administered Medications   Medication Dose Route Frequency    acetaminophen (TYLENOL) tablet 1,000 mg  1,000 mg Oral NOW     Current Outpatient Medications   Medication Sig    acetaminophen (TYLENOL) 325 mg tablet Take 2 Tablets by mouth every four (4) hours as needed for Pain.    prenatal vitamin (Prenatal) 28 mg iron- 800 mcg tab tablet Take 1 Tablet by mouth daily. (Patient not taking: Reported on 2022)    OXcarbazepine (TRILEPTAL) 150 mg tablet Take 150 mg by mouth three (3) times daily. (Patient not taking: Reported on 2022)    traZODone (DESYREL) 50 mg tablet Take  by mouth nightly. Disposition:  Home     DISCHARGE NOTE:   Pt has been reexamined. Patient has no new complaints, changes, or physical findings. Care plan outlined and precautions discussed. Results of workup were reviewed with the patient. All medications were reviewed with the patient. All of pt's questions and concerns were addressed. Patient was instructed and agrees to follow up with PCP/OBGYN as well as to return to the ED upon further deterioration. Patient is ready to go home. Follow-up Information       Follow up With Specialties Details Why Contact Info    [facility] EMERGENCY DEPT Emergency Medicine  As needed [address] [place]  [phone]    Follow-up in 1-2 days with your OBGYN                Current Discharge Medication List        START taking these medications    Details   acetaminophen (TYLENOL) 325 mg tablet Take 2 Tablets by mouth every four (4) hours as needed for Pain. Qty: 20 Tablet, Refills: 0  Start date: 7/22/2022                 Diagnosis     Clinical Impression:   1. Threatened miscarriage          \"Please note that this dictation was completed with [employer], the computer voice recognition software. Quite often unanticipated grammatical, syntax, homophones, and other interpretive errors are inadvertently transcribed by the computer software. Please disregard these errors. Please excuse any errors that have escaped final proofreading. \"

## 2022-07-22 NOTE — Clinical Note
[address] [place] [address] [address] [facility] EMERGENCY DEPT  [address] [address] [phone]    Work/School Note    Date: 7/22/2022    To Whom It May concern:      Bhavesh Lama was seen and treated today in the emergency room by the following provider(s):  Attending Provider: Geoffery Schwab, MD  Physician Assistant: MERCEDES Hamlin. Bhavesh Lama is excused from work/school on 07/22/22. She is clear to return to work/school on 07/23/22.         Sincerely,          MERCEDES Lagunas

## 2022-07-22 NOTE — Clinical Note
[facility] EMERGENCY [facility]PT  [address] [id number] [phone]    Work/School Note    Date: 7/22/2022    To Whom It May concern:    Eugenio Pretty was seen and treated today in the emergency room by the following provider(s):  Physician Assistant: MERCEDES Lomas. Eugenio Pretty is excused from work/school on 7/23/2022 through 7/25/2022. She is medically clear to return to work/school on 7/26/2022.          Sincerely,          Fernando Shipley MD

## 2022-07-22 NOTE — Clinical Note
[address] [place] [address] [address] [facility] EMERGENCY DEPT  [address] [id number] [phone]    Work/School Note    Date: 7/22/2022    To Whom It May concern:      Chente Flood was seen and treated today in the emergency room by the following provider(s):  Attending Provider: Bouchra Wisdom MD  Physician Assistant: MERCEDES Dao. Chente Flood is excused from work/school on 07/22/22. She is clear to return to work/school on 07/23/22.         Sincerely,          MERCEDES Elizalde

## 2022-08-02 ENCOUNTER — APPOINTMENT (OUTPATIENT)
Dept: ULTRASOUND IMAGING | Age: 27
End: 2022-08-02
Attending: PHYSICIAN ASSISTANT
Payer: MEDICAID

## 2022-08-02 ENCOUNTER — HOSPITAL ENCOUNTER (EMERGENCY)
Age: 27
Discharge: HOME | End: 2022-08-02
Attending: STUDENT IN AN ORGANIZED HEALTH CARE EDUCATION/TRAINING PROGRAM
Payer: MEDICAID

## 2022-08-02 VITALS
TEMPERATURE: 98.2 F | RESPIRATION RATE: 20 BRPM | SYSTOLIC BLOOD PRESSURE: 117 MMHG | HEART RATE: 70 BPM | DIASTOLIC BLOOD PRESSURE: 67 MMHG | OXYGEN SATURATION: 98 %

## 2022-08-02 DIAGNOSIS — O26.899 ABDOMINAL PAIN AFFECTING PREGNANCY: Primary | ICD-10-CM

## 2022-08-02 DIAGNOSIS — R10.9 ABDOMINAL PAIN AFFECTING PREGNANCY: Primary | ICD-10-CM

## 2022-08-02 DIAGNOSIS — A59.9 TRICHIMONIASIS: ICD-10-CM

## 2022-08-02 LAB
ALBUMIN SERPL-MCNC: 3.3 G/DL (ref 3.4–5)
ALBUMIN/GLOB SERPL: 0.8 {RATIO} (ref 0.8–1.7)
ALP SERPL-CCNC: 58 U/L (ref 45–117)
ALT SERPL-CCNC: 44 U/L (ref 13–56)
ANION GAP SERPL CALC-SCNC: 5 MMOL/L (ref 3–18)
APPEARANCE UR: ABNORMAL
AST SERPL-CCNC: 44 U/L (ref 10–38)
BACTERIA URNS QL MICRO: ABNORMAL /HPF
BASOPHILS # BLD: 0 K/UL (ref 0–0.1)
BASOPHILS NFR BLD: 0 % (ref 0–2)
BILIRUB SERPL-MCNC: 0.3 MG/DL (ref 0.2–1)
BILIRUB UR QL: NEGATIVE
BUN SERPL-MCNC: 7 MG/DL (ref 7–18)
BUN/CREAT SERPL: 10 (ref 12–20)
CALCIUM SERPL-MCNC: 8.6 MG/DL (ref 8.5–10.1)
CHLORIDE SERPL-SCNC: 107 MMOL/L (ref 100–111)
CO2 SERPL-SCNC: 22 MMOL/L (ref 21–32)
COLOR UR: YELLOW
CREAT SERPL-MCNC: 0.68 MG/DL (ref 0.6–1.3)
DIFFERENTIAL METHOD BLD: NORMAL
EOSINOPHIL # BLD: 0.3 K/UL (ref 0–0.4)
EOSINOPHIL NFR BLD: 3 % (ref 0–5)
EPITH CASTS URNS QL MICRO: ABNORMAL /LPF (ref 0–5)
ERYTHROCYTE [DISTWIDTH] IN BLOOD BY AUTOMATED COUNT: 13.6 % (ref 11.6–14.5)
GLOBULIN SER CALC-MCNC: 4.1 G/DL (ref 2–4)
GLUCOSE SERPL-MCNC: 98 MG/DL (ref 74–99)
GLUCOSE UR STRIP.AUTO-MCNC: NEGATIVE MG/DL
HCG SERPL-ACNC: ABNORMAL MIU/ML (ref 0–10)
HCT VFR BLD AUTO: 38.4 % (ref 35–45)
HGB BLD-MCNC: 12.9 G/DL (ref 12–16)
HGB UR QL STRIP: NEGATIVE
IMM GRANULOCYTES # BLD AUTO: 0 K/UL (ref 0–0.04)
IMM GRANULOCYTES NFR BLD AUTO: 0 % (ref 0–0.5)
KETONES UR QL STRIP.AUTO: NEGATIVE MG/DL
LEUKOCYTE ESTERASE UR QL STRIP.AUTO: ABNORMAL
LYMPHOCYTES # BLD: 2.5 K/UL (ref 0.9–3.6)
LYMPHOCYTES NFR BLD: 29 % (ref 21–52)
MCH RBC QN AUTO: 28.5 PG (ref 24–34)
MCHC RBC AUTO-ENTMCNC: 33.6 G/DL (ref 31–37)
MCV RBC AUTO: 85 FL (ref 78–100)
MONOCYTES # BLD: 0.7 K/UL (ref 0.05–1.2)
MONOCYTES NFR BLD: 8 % (ref 3–10)
NEUTS SEG # BLD: 5.3 K/UL (ref 1.8–8)
NEUTS SEG NFR BLD: 60 % (ref 40–73)
NITRITE UR QL STRIP.AUTO: NEGATIVE
NRBC # BLD: 0 K/UL (ref 0–0.01)
NRBC BLD-RTO: 0 PER 100 WBC
PH UR STRIP: 6 [PH] (ref 5–8)
PLATELET # BLD AUTO: 283 K/UL (ref 135–420)
PMV BLD AUTO: 10.7 FL (ref 9.2–11.8)
POTASSIUM SERPL-SCNC: 4.7 MMOL/L (ref 3.5–5.5)
PROT SERPL-MCNC: 7.4 G/DL (ref 6.4–8.2)
PROT UR STRIP-MCNC: NEGATIVE MG/DL
RBC # BLD AUTO: 4.52 M/UL (ref 4.2–5.3)
RBC #/AREA URNS HPF: NEGATIVE /HPF (ref 0–5)
SERVICE CMNT-IMP: NORMAL
SODIUM SERPL-SCNC: 134 MMOL/L (ref 136–145)
SP GR UR REFRACTOMETRY: 1.02 (ref 1–1.03)
TRICHOMONAS UR QL MICRO: ABNORMAL
UROBILINOGEN UR QL STRIP.AUTO: 1 EU/DL (ref 0.2–1)
WBC # BLD AUTO: 8.7 K/UL (ref 4.6–13.2)
WBC URNS QL MICRO: ABNORMAL /HPF (ref 0–4)
WET PREP GENITAL: NORMAL

## 2022-08-02 PROCEDURE — 81001 URINALYSIS AUTO W/SCOPE: CPT

## 2022-08-02 PROCEDURE — 87210 SMEAR WET MOUNT SALINE/INK: CPT

## 2022-08-02 PROCEDURE — 74011000250 HC RX REV CODE- 250: Performed by: PHYSICIAN ASSISTANT

## 2022-08-02 PROCEDURE — 96372 THER/PROPH/DIAG INJ SC/IM: CPT

## 2022-08-02 PROCEDURE — 87086 URINE CULTURE/COLONY COUNT: CPT

## 2022-08-02 PROCEDURE — 87491 CHLMYD TRACH DNA AMP PROBE: CPT

## 2022-08-02 PROCEDURE — 84702 CHORIONIC GONADOTROPIN TEST: CPT

## 2022-08-02 PROCEDURE — 74011250637 HC RX REV CODE- 250/637: Performed by: PHYSICIAN ASSISTANT

## 2022-08-02 PROCEDURE — 80053 COMPREHEN METABOLIC PANEL: CPT

## 2022-08-02 PROCEDURE — 99284 EMERGENCY DEPT VISIT MOD MDM: CPT

## 2022-08-02 PROCEDURE — 76817 TRANSVAGINAL US OBSTETRIC: CPT

## 2022-08-02 PROCEDURE — 85025 COMPLETE CBC W/AUTO DIFF WBC: CPT

## 2022-08-02 PROCEDURE — 74011250636 HC RX REV CODE- 250/636: Performed by: PHYSICIAN ASSISTANT

## 2022-08-02 RX ORDER — METRONIDAZOLE 500 MG/1
2000 TABLET ORAL
Status: COMPLETED | OUTPATIENT
Start: 2022-08-02 | End: 2022-08-02

## 2022-08-02 RX ADMIN — METRONIDAZOLE 2000 MG: 500 TABLET ORAL at 14:24

## 2022-08-02 RX ADMIN — LIDOCAINE HYDROCHLORIDE 500 MG: 10 INJECTION, SOLUTION EPIDURAL; INFILTRATION; INTRACAUDAL; PERINEURAL at 14:27

## 2022-08-02 NOTE — Clinical Note
[address] [place] [address] [address] [facility] EMERGENCY DEPT  [address] [address] [phone]    Work/School Note    Date: 8/2/2022    To Whom It May concern:      Patti Valencia was seen and treated today in the emergency room by the following provider(s):  Attending Provider: Jacquelyn Frank DO  Physician Assistant: Warner Simms. Patti Valencia is excused from work/school on 08/02/22. She is clear to return to work/school on 08/03/22.         Sincerely,          MERCEDES Liriano

## 2022-08-02 NOTE — ED TRIAGE NOTES
Patient states she is having abdominal pain that started more severely this morning. Patient has been having cramping but it is more severe this morning. Patient denies bleeding.

## 2022-08-02 NOTE — ED NOTES
I have reviewed discharge instructions with the patient. The patient verbalized understanding. Pt ambulated to [facility]. A&ox4. Vital signs stable at time of discharge.

## 2022-08-02 NOTE — DISCHARGE INSTRUCTIONS
Take medication as prescribed. Follow-up with your obstetrician within 2 days for reassessment. Bring the results from this visit with you for their review. Return to the ED immediately for any new, worsening, or persistent symptoms, including vaginal bleeding, vomiting, or any other medical concerns.

## 2022-08-02 NOTE — ED PROVIDER NOTES
EMERGENCY DEPARTMENT HISTORY AND PHYSICAL EXAM    10:01 AM      Date: 2022  Patient Name: Palmer Chaves    History of Presenting Illness     Chief Complaint   Patient presents with    Abdominal Pain         History Provided By: Patient    Additional History (Context): Palmer Chaves is a 28 y.o. female with  hx of bipolar d/o, depression, PTSD, schizophrenia, and other noted PMH  who presents with complaint of lower abdominal cramping x1 day. Patient notes vaginal discharge, denies concern for STIs. Patient denies fever or chills, nausea or vomiting, diarrhea, dysuria, hematuria, vaginal bleeding. Pt notes she had evaluation with obstetrician last week. . Unknown LMP    PCP: None    Current Outpatient Medications   Medication Sig Dispense Refill    acetaminophen (TYLENOL) 325 mg tablet Take 2 Tablets by mouth every four (4) hours as needed for Pain. 20 Tablet 0    prenatal vitamin (Prenatal) 28 mg iron- 800 mcg tab tablet Take 1 Tablet by mouth daily. (Patient not taking: Reported on 2022) 30 Tablet 0    OXcarbazepine (TRILEPTAL) 150 mg tablet Take 150 mg by mouth three (3) times daily. (Patient not taking: Reported on 2022)      traZODone (DESYREL) 50 mg tablet Take  by mouth nightly. Past History     Past Medical History:  Past Medical History:   Diagnosis Date    Bipolar 1 disorder ([address]     Bipolar 2 disorder ([address] 8/3/2010    Borderline personality disorder ([address]     Depression 8/3/2010    Major depression     Psychiatric disorder     Psychosis ([address]     PTSD (post-traumatic stress disorder) 8/3/2010    PTSD (post-traumatic stress disorder)     Schizophrenia ([address]        Past Surgical History:  No past surgical history on file.     Family History:  Family History   Problem Relation Age of Onset    Cancer Maternal Grandfather         throat cancer    Hypertension Maternal Uncle     Diabetes Maternal Grandmother     Hypertension Maternal Grandmother     Cancer Maternal Grandmother cervical cancer    Diabetes Paternal Grandmother     Hypertension Paternal Grandfather        Social History:  Social History     Tobacco Use    Smoking status: Every Day     Packs/day: 0.25     Years: 1.00     Pack years: 0.25     Types: Cigarettes    Smokeless tobacco: Never   Substance Use Topics    Alcohol use: Yes     Alcohol/week: 0.0 standard drinks     Comment: occasionally    Drug use: Yes     Frequency: 7.0 times per week     Types: Marijuana     Comment: last used cocaine 6/2010       Allergies:  No Known Allergies      Review of Systems       Review of Systems   Constitutional:  Negative for chills and fever. Respiratory:  Negative for shortness of breath. Cardiovascular:  Negative for chest pain. Gastrointestinal:  Positive for abdominal pain. Negative for nausea and vomiting. Skin:  Negative for rash. Neurological:  Negative for weakness. All other systems reviewed and are negative. Physical Exam   Visit Vitals  /67 (BP 1 Location: Left upper arm, BP Patient Position: At rest;Sitting)   Pulse 70   Temp 98.2 °F (36.8 °C)   Resp 20   SpO2 98%         Physical Exam  Vitals and nursing note reviewed. Constitutional:       General: She is not in acute distress. Appearance: She is well-developed. She is not ill-appearing, toxic-appearing or diaphoretic. HENT:      Head: Normocephalic and atraumatic. Cardiovascular:      Rate and Rhythm: Normal rate and regular rhythm. Heart sounds: Normal heart sounds. No murmur heard. No friction rub. No gallop. Pulmonary:      Effort: Pulmonary effort is normal. No respiratory distress. Breath sounds: Normal breath sounds. No wheezing or rales. Abdominal:      General: Abdomen is flat. Bowel sounds are normal.      Palpations: Abdomen is soft. Tenderness: There is no abdominal tenderness. There is no right CVA tenderness, left CVA tenderness, guarding or rebound. Negative signs include Kendrick's sign. Musculoskeletal:         General: Normal range of motion. Cervical back: Normal range of motion and neck supple. Skin:     General: Skin is warm. Findings: No rash. Neurological:      Mental Status: She is alert. Diagnostic Study Results     Labs -  Recent Results (from the past 12 hour(s))   CBC WITH AUTOMATED DIFF    Collection Time: 08/02/22  9:45 AM   Result Value Ref Range    WBC 8.7 4.6 - 13.2 K/uL    RBC 4.52 4.20 - 5.30 M/uL    HGB 12.9 12.0 - 16.0 g/dL    HCT 38.4 35.0 - 45.0 %    MCV 85.0 78.0 - 100.0 FL    MCH 28.5 24.0 - 34.0 PG    MCHC 33.6 31.0 - 37.0 g/dL    RDW 13.6 11.6 - 14.5 %    PLATELET 380 042 - 457 K/uL    MPV 10.7 9.2 - 11.8 FL    NRBC 0.0 0  WBC    ABSOLUTE NRBC 0.00 0.00 - 0.01 K/uL    NEUTROPHILS 60 40 - 73 %    LYMPHOCYTES 29 21 - 52 %    MONOCYTES 8 3 - 10 %    EOSINOPHILS 3 0 - 5 %    BASOPHILS 0 0 - 2 %    IMMATURE GRANULOCYTES 0 0.0 - 0.5 %    ABS. NEUTROPHILS 5.3 1.8 - 8.0 K/UL    ABS. LYMPHOCYTES 2.5 0.9 - 3.6 K/UL    ABS. MONOCYTES 0.7 0.05 - 1.2 K/UL    ABS. EOSINOPHILS 0.3 0.0 - 0.4 K/UL    ABS. BASOPHILS 0.0 0.0 - 0.1 K/UL    ABS. IMM. GRANS. 0.0 0.00 - 0.04 K/UL    DF AUTOMATED     METABOLIC PANEL, COMPREHENSIVE    Collection Time: 08/02/22  9:45 AM   Result Value Ref Range    Sodium 134 (L) 136 - 145 mmol/L    Potassium 4.7 3.5 - 5.5 mmol/L    Chloride 107 100 - 111 mmol/L    CO2 22 21 - 32 mmol/L    Anion gap 5 3.0 - 18 mmol/L    Glucose 98 74 - 99 mg/dL    BUN 7 7.0 - 18 MG/DL    Creatinine 0.68 0.6 - 1.3 MG/DL    BUN/Creatinine ratio 10 (L) 12 - 20      GFR est AA >60 >60 ml/min/1.73m2    GFR est non-AA >60 >60 ml/min/1.73m2    Calcium 8.6 8.5 - 10.1 MG/DL    Bilirubin, total 0.3 0.2 - 1.0 MG/DL    ALT (SGPT) 44 13 - 56 U/L    AST (SGOT) 44 (H) 10 - 38 U/L    Alk.  phosphatase 58 45 - 117 U/L    Protein, total 7.4 6.4 - 8.2 g/dL    Albumin 3.3 (L) 3.4 - 5.0 g/dL    Globulin 4.1 (H) 2.0 - 4.0 g/dL    A-G Ratio 0.8 0.8 - 1.7     BETA HCG, QT Collection Time: 08/02/22  9:45 AM   Result Value Ref Range    Beta HCG, QT 64,292 (H) 0 - 10 MIU/ML   URINALYSIS W/ RFLX MICROSCOPIC    Collection Time: 08/02/22 10:10 AM   Result Value Ref Range    Color YELLOW      Appearance CLOUDY      Specific gravity 1.017 1.005 - 1.030      pH (UA) 6.0 5.0 - 8.0      Protein Negative NEG mg/dL    Glucose Negative NEG mg/dL    Ketone Negative NEG mg/dL    Bilirubin Negative NEG      Blood Negative NEG      Urobilinogen 1.0 0.2 - 1.0 EU/dL    Nitrites Negative NEG      Leukocyte Esterase SMALL (A) NEG     WET PREP    Collection Time: 08/02/22 10:10 AM    Specimen: Vagina   Result Value Ref Range    Special Requests: NO SPECIAL REQUESTS      Wet prep FEW  CLUE CELLS PRESENT        Wet prep NO TRICHOMONAS SEEN      Wet prep NO YEAST SEEN     URINE MICROSCOPIC ONLY    Collection Time: 08/02/22 10:10 AM   Result Value Ref Range    WBC 1 to 4 0 - 4 /hpf    RBC Negative 0 - 5 /hpf    Epithelial cells 4+ 0 - 5 /lpf    Bacteria 2+ (A) NEG /hpf    Trichomonas FEW (A) NEG         Radiologic Studies -   US OB < 14 WKS W DOPPLER   Final Result   Single intrauterine pregnancy at 6 weeks 6 days with positive fetal   heart rate. Medical Decision Making   I am the first provider for this patient. I reviewed the vital signs, available nursing notes, past medical history, past surgical history, family history and social history. Vital Signs-Reviewed the patient's vital signs. Records Reviewed: Nursing Notes and Old Medical Records (Time of Review: 10:01 AM)    ED Course: Progress Notes, Reevaluation, and Consults:  2:00 PM: Reviewed results and plan with patient. Discussed need for close outpatient follow-up this week for reassessment. Discussed strict return precautions, including fever, vomiting, vaginal bleeding, or any other medical concerns. Pt in agreement with plan.      Provider Notes (Medical Decision Making): 26-year-old female who presents to the ED due to lower abdominal cramping and vaginal discharge. Afebrile, nontoxic-appearing, looks well. Abdomen soft and nontender to palpation. Labs essentially unremarkable, UA demonstrates trichomoniasis. Cultures sent and pending. Ultrasound demonstrates IUP at 6 weeks and 6 days with fetal heart rate. Patient is stable for discharge with close outpatient follow-up for further assessment. Strict return precautions provided. Diagnosis     Clinical Impression:   1. Abdominal pain affecting pregnancy    2. Trichimoniasis        Disposition: home     Follow-up Information       Follow up With Specialties Details Why [address] [facility]    [facility] EMERGENCY DEPT Emergency Medicine  If symptoms worsen [address] [facility] [address]  [phone]    [facility] Obstetrics And Gynecology Obstetrics & Gynecology, Obstetrics Schedule an appointment as soon as possible for a visit   [address] [place] [address]  [address] [facility] Obstetrics And Gynecology  Schedule an appointment as soon as possible for a visit   [address] [address]  [phone]             Discharge Medication List as of 8/2/2022  2:06 PM        CONTINUE these medications which have NOT CHANGED    Details   acetaminophen (TYLENOL) 325 mg tablet Take 2 Tablets by mouth every four (4) hours as needed for Pain., Normal, Disp-20 Tablet, R-0      prenatal vitamin (Prenatal) 28 mg iron- 800 mcg tab tablet Take 1 Tablet by mouth daily. , Normal, Disp-30 Tablet, R-0      OXcarbazepine (TRILEPTAL) 150 mg tablet Take 150 mg by mouth three (3) times daily. , Historical Med      traZODone (DESYREL) 50 mg tablet Take  by mouth nightly., Historical Med             Dictation disclaimer:  Please note that this dictation was completed with [employer], the [employer] voice recognition software. Quite often unanticipated grammatical, syntax, homophones, and other interpretive errors are inadvertently transcribed by the computer software. Please disregard these errors. Please excuse any errors that have escaped final proofreading.

## 2022-08-02 NOTE — Clinical Note
[address] [place] [address] Dr [facility] EMERGENCY DEPT  [address] [address] [phone]    Work/School Note    Date: 8/2/2022    To Whom It May concern:      Shahida Jaffe was seen and treated today in the emergency room by the following provider(s):  Attending Provider: Helen Zavala DO  Physician Assistant: Warner Argueta. Shahida Jaffe is excused from work/school on 08/02/22. She is clear to return to work/school on 08/03/22.         Sincerely,          MERCEDES Shepard

## 2022-08-03 LAB
BACTERIA SPEC CULT: NORMAL
SERVICE CMNT-IMP: NORMAL

## 2022-08-05 LAB
C TRACH RRNA SPEC QL NAA+PROBE: NEGATIVE
N GONORRHOEA RRNA SPEC QL NAA+PROBE: NEGATIVE
SPECIMEN SOURCE: NORMAL

## 2022-08-23 ENCOUNTER — HOSPITAL ENCOUNTER (EMERGENCY)
Age: 27
Discharge: HOME | End: 2022-08-23
Attending: EMERGENCY MEDICINE
Payer: MEDICAID

## 2022-08-23 ENCOUNTER — APPOINTMENT (OUTPATIENT)
Dept: ULTRASOUND IMAGING | Age: 27
End: 2022-08-23
Attending: PHYSICIAN ASSISTANT
Payer: MEDICAID

## 2022-08-23 VITALS
DIASTOLIC BLOOD PRESSURE: 83 MMHG | RESPIRATION RATE: 18 BRPM | HEART RATE: 79 BPM | HEIGHT: 63 IN | BODY MASS INDEX: 27.29 KG/M2 | TEMPERATURE: 98.9 F | OXYGEN SATURATION: 98 % | WEIGHT: 154 LBS | SYSTOLIC BLOOD PRESSURE: 116 MMHG

## 2022-08-23 DIAGNOSIS — O46.8X1 SUBCHORIONIC HEMORRHAGE OF PLACENTA IN FIRST TRIMESTER, SINGLE OR UNSPECIFIED FETUS: ICD-10-CM

## 2022-08-23 DIAGNOSIS — R10.30 LOWER ABDOMINAL PAIN: Primary | ICD-10-CM

## 2022-08-23 DIAGNOSIS — R10.9 ABDOMINAL PAIN: ICD-10-CM

## 2022-08-23 DIAGNOSIS — N76.0 BV (BACTERIAL VAGINOSIS): ICD-10-CM

## 2022-08-23 DIAGNOSIS — B96.89 BV (BACTERIAL VAGINOSIS): ICD-10-CM

## 2022-08-23 DIAGNOSIS — O41.8X10 SUBCHORIONIC HEMORRHAGE OF PLACENTA IN FIRST TRIMESTER, SINGLE OR UNSPECIFIED FETUS: ICD-10-CM

## 2022-08-23 LAB
ALBUMIN SERPL-MCNC: 3.3 G/DL (ref 3.4–5)
ALBUMIN/GLOB SERPL: 0.9 {RATIO} (ref 0.8–1.7)
ALP SERPL-CCNC: 61 U/L (ref 45–117)
ALT SERPL-CCNC: 47 U/L (ref 13–56)
ANION GAP SERPL CALC-SCNC: 7 MMOL/L (ref 3–18)
APPEARANCE UR: CLEAR
AST SERPL-CCNC: 29 U/L (ref 10–38)
BASOPHILS # BLD: 0 K/UL (ref 0–0.1)
BASOPHILS NFR BLD: 0 % (ref 0–2)
BILIRUB SERPL-MCNC: 0.5 MG/DL (ref 0.2–1)
BILIRUB UR QL: NEGATIVE
BUN SERPL-MCNC: 10 MG/DL (ref 7–18)
BUN/CREAT SERPL: 14 (ref 12–20)
CALCIUM SERPL-MCNC: 8.8 MG/DL (ref 8.5–10.1)
CHLORIDE SERPL-SCNC: 108 MMOL/L (ref 100–111)
CO2 SERPL-SCNC: 23 MMOL/L (ref 21–32)
COLOR UR: YELLOW
CREAT SERPL-MCNC: 0.69 MG/DL (ref 0.6–1.3)
DIFFERENTIAL METHOD BLD: NORMAL
EOSINOPHIL # BLD: 0.3 K/UL (ref 0–0.4)
EOSINOPHIL NFR BLD: 4 % (ref 0–5)
ERYTHROCYTE [DISTWIDTH] IN BLOOD BY AUTOMATED COUNT: 13.3 % (ref 11.6–14.5)
GLOBULIN SER CALC-MCNC: 3.8 G/DL (ref 2–4)
GLUCOSE SERPL-MCNC: 92 MG/DL (ref 74–99)
GLUCOSE UR STRIP.AUTO-MCNC: NEGATIVE MG/DL
HCG SERPL-ACNC: ABNORMAL MIU/ML (ref 0–10)
HCT VFR BLD AUTO: 36.9 % (ref 35–45)
HGB BLD-MCNC: 12.6 G/DL (ref 12–16)
HGB UR QL STRIP: NEGATIVE
IMM GRANULOCYTES # BLD AUTO: 0 K/UL (ref 0–0.04)
IMM GRANULOCYTES NFR BLD AUTO: 0 % (ref 0–0.5)
KETONES UR QL STRIP.AUTO: NEGATIVE MG/DL
LEUKOCYTE ESTERASE UR QL STRIP.AUTO: NEGATIVE
LYMPHOCYTES # BLD: 3.4 K/UL (ref 0.9–3.6)
LYMPHOCYTES NFR BLD: 37 % (ref 21–52)
MCH RBC QN AUTO: 28.6 PG (ref 24–34)
MCHC RBC AUTO-ENTMCNC: 34.1 G/DL (ref 31–37)
MCV RBC AUTO: 83.7 FL (ref 78–100)
MONOCYTES # BLD: 0.7 K/UL (ref 0.05–1.2)
MONOCYTES NFR BLD: 8 % (ref 3–10)
NEUTS SEG # BLD: 4.6 K/UL (ref 1.8–8)
NEUTS SEG NFR BLD: 50 % (ref 40–73)
NITRITE UR QL STRIP.AUTO: NEGATIVE
NRBC # BLD: 0 K/UL (ref 0–0.01)
NRBC BLD-RTO: 0 PER 100 WBC
PH UR STRIP: 6.5 [PH] (ref 5–8)
PLATELET # BLD AUTO: 268 K/UL (ref 135–420)
PMV BLD AUTO: 9.8 FL (ref 9.2–11.8)
POTASSIUM SERPL-SCNC: 3.8 MMOL/L (ref 3.5–5.5)
PROT SERPL-MCNC: 7.1 G/DL (ref 6.4–8.2)
PROT UR STRIP-MCNC: NEGATIVE MG/DL
RBC # BLD AUTO: 4.41 M/UL (ref 4.2–5.3)
SERVICE CMNT-IMP: NORMAL
SODIUM SERPL-SCNC: 138 MMOL/L (ref 136–145)
SP GR UR REFRACTOMETRY: 1.02 (ref 1–1.03)
UROBILINOGEN UR QL STRIP.AUTO: 1 EU/DL (ref 0.2–1)
WBC # BLD AUTO: 9.2 K/UL (ref 4.6–13.2)
WET PREP GENITAL: NORMAL

## 2022-08-23 PROCEDURE — 87086 URINE CULTURE/COLONY COUNT: CPT

## 2022-08-23 PROCEDURE — 81003 URINALYSIS AUTO W/O SCOPE: CPT

## 2022-08-23 PROCEDURE — 85025 COMPLETE CBC W/AUTO DIFF WBC: CPT

## 2022-08-23 PROCEDURE — 84702 CHORIONIC GONADOTROPIN TEST: CPT

## 2022-08-23 PROCEDURE — 74011250637 HC RX REV CODE- 250/637: Performed by: PHYSICIAN ASSISTANT

## 2022-08-23 PROCEDURE — 99284 EMERGENCY DEPT VISIT MOD MDM: CPT

## 2022-08-23 PROCEDURE — 76817 TRANSVAGINAL US OBSTETRIC: CPT

## 2022-08-23 PROCEDURE — 80053 COMPREHEN METABOLIC PANEL: CPT

## 2022-08-23 PROCEDURE — 87491 CHLMYD TRACH DNA AMP PROBE: CPT

## 2022-08-23 PROCEDURE — 87210 SMEAR WET MOUNT SALINE/INK: CPT

## 2022-08-23 RX ORDER — METRONIDAZOLE 500 MG/1
500 TABLET ORAL 2 TIMES DAILY
Qty: 14 TABLET | Refills: 0 | Status: SHIPPED | OUTPATIENT
Start: 2022-08-23 | End: 2022-08-30

## 2022-08-23 RX ORDER — ACETAMINOPHEN 325 MG/1
650 TABLET ORAL
Status: COMPLETED | OUTPATIENT
Start: 2022-08-23 | End: 2022-08-23

## 2022-08-23 RX ADMIN — ACETAMINOPHEN 650 MG: 325 TABLET, FILM COATED ORAL at 15:59

## 2022-08-23 NOTE — ED PROVIDER NOTES
EMERGENCY DEPARTMENT HISTORY AND PHYSICAL EXAM    3:43 PM      Date: 2022  Patient Name: Reed Hill    History of Presenting Illness     Chief Complaint   Patient presents with    Abdominal Pain       History Provided By: Patient    Additional History (Context): Reed Hill is a 28 y.o. female with  hx of bipolar d/o and other noted PMH,  ~9 weeks pregnant by last US who presents with complaint of diffuse lower abdominal pain associated with vaginal discharge x 3 days. Patient notes she was evaluated by her obstetrician after the last visit in the ED, and everything looked \"ok\". Patient denies fever or chills, nausea or vomiting, dysuria, hematuria. Patient notes she has been taking Tylenol for symptoms without relief. Unknown LMP    PCP: None    Current Outpatient Medications   Medication Sig Dispense Refill    metroNIDAZOLE (FlagyL) 500 mg tablet Take 1 Tablet by mouth two (2) times a day for 7 days. 14 Tablet 0    acetaminophen (TYLENOL) 325 mg tablet Take 2 Tablets by mouth every four (4) hours as needed for Pain. 20 Tablet 0    prenatal vitamin (Prenatal) 28 mg iron- 800 mcg tab tablet Take 1 Tablet by mouth daily. (Patient not taking: Reported on 2022) 30 Tablet 0    OXcarbazepine (TRILEPTAL) 150 mg tablet Take 150 mg by mouth three (3) times daily. (Patient not taking: Reported on 2022)      traZODone (DESYREL) 50 mg tablet Take  by mouth nightly. Past History     Past Medical History:  Past Medical History:   Diagnosis Date    Bipolar 1 disorder ([facility]     Bipolar 2 disorder ([facility] 8/3/2010    Borderline personality disorder ([address]     Depression 8/3/2010    Major depression     Psychiatric disorder     Psychosis ([address]     PTSD (post-traumatic stress disorder) 8/3/2010    PTSD (post-traumatic stress disorder)     Schizophrenia ([facility]        Past Surgical History:  No past surgical history on file.     Family History:  Family History   Problem Relation Age of Onset    Cancer Maternal Grandfather         throat cancer    Hypertension Maternal Uncle     Diabetes Maternal Grandmother     Hypertension Maternal Grandmother     Cancer Maternal Grandmother         cervical cancer    Diabetes Paternal Grandmother     Hypertension Paternal Grandfather        Social History:  Social History     Tobacco Use    Smoking status: Every Day     Packs/day: 0.25     Years: 1.00     Pack years: 0.25     Types: Cigarettes    Smokeless tobacco: Never   Substance Use Topics    Alcohol use: Yes     Alcohol/week: 0.0 standard drinks     Comment: occasionally    Drug use: Yes     Frequency: 7.0 times per week     Types: Marijuana     Comment: last used cocaine 6/2010       Allergies:  No Known Allergies      Review of Systems       Review of Systems   Constitutional:  Negative for chills and fever. Respiratory:  Negative for shortness of breath. Cardiovascular:  Negative for chest pain. Gastrointestinal:  Positive for abdominal pain. Negative for nausea and vomiting. Genitourinary:  Positive for vaginal discharge. Skin:  Negative for rash. Neurological:  Negative for weakness. All other systems reviewed and are negative. Physical Exam   Visit Vitals  /83   Pulse 79   Temp 98.9 °F (37.2 °C)   Resp 18   Ht 5' 3\" (1.6 m)   Wt 69.9 kg (154 lb)   SpO2 98%   BMI 27.28 kg/m²         Physical Exam  Vitals and nursing note reviewed. Constitutional:       General: She is not in acute distress. Appearance: Normal appearance. She is well-developed. She is not ill-appearing, toxic-appearing or diaphoretic. HENT:      Head: Normocephalic and atraumatic. Cardiovascular:      Rate and Rhythm: Normal rate and regular rhythm. Heart sounds: Normal heart sounds. No murmur heard. No friction rub. No gallop. Pulmonary:      Effort: Pulmonary effort is normal. No respiratory distress. Breath sounds: Normal breath sounds. No wheezing or rales.    Abdominal:      General: Abdomen is flat. Bowel sounds are normal. There is no distension. Palpations: Abdomen is soft. Tenderness: There is no abdominal tenderness. There is no right CVA tenderness, guarding or rebound. Genitourinary:     Comments: See pelvic procedure   Musculoskeletal:         General: Normal range of motion. Cervical back: Normal range of motion and neck supple. Skin:     General: Skin is warm. Findings: No rash. Neurological:      Mental Status: She is alert. Diagnostic Study Results     Labs -  No results found for this or any previous visit (from the past 12 hour(s)). Radiologic Studies -   US OB TV W DOPPLER   Final Result   Single intrauterine pregnancy with average ultrasound age 11 weeks 3 days.   -Fetal heart rate 193 bpm   -Small-to-moderate amount of subchorionic hemorrhage, recommend attention on   follow-up. The left ovary cannot be visualized. Medical Decision Making   I am the first provider for this patient. I reviewed the vital signs, available nursing notes, past medical history, past surgical history, family history and social history. Vital Signs-Reviewed the patient's vital signs. Records Reviewed: Nursing Notes and Old Medical Records (Time of Review: 3:43 PM)    ED Course: Progress Notes, Reevaluation, and Consults:  5:30 PM: [facility] Discussed care with Dr. Kirt Verdin, who evaluated patient at bedside. Bedside US demonstrates IUP with good movement and fetal heart rate. Recommends formal US for further assessment. Recommends close follow-up with obstetrician for bartholins cyst I+D. Pt notes symptoms have been present x 2 months, would prefer to follow-up with ob-gyn as well. PROGRESS NOTE:  7:00 PM:   Patient care will be transferred to Venessa Weiss PA-C. Discussed available diagnostic results and care plan at length. Pending formal US and report.    Written by Yonas Corea PA-C     Pelvic Exam    Date/Time: 8/23/2022 3:45 PM  Performed by: PA  Procedure duration:  5 minutes. Documented by:  Juvencio Buchanan. Exam assisted by:  Nurse. Type of exam performed: bimanual and speculum. External genitalia appearance: abcess (~3cm L bartholins, fluctuant, no drainage). Vaginal exam:  discharge. The amount of discharge was:  mild. The discharge was thin. Cervical exam:  normal.    Specimen(s) collected:  chlamydia and GC. Bimanual exam:  normal.    Patient tolerance: patient tolerated the procedure well with no immediate complications         Diagnosis     Clinical Impression:   1. Lower abdominal pain    2. BV (bacterial vaginosis)    3. Subchorionic hemorrhage of placenta in first trimester, single or unspecified fetus    4. Abdominal pain        Disposition: home     Follow-up Information       Follow up With Specialties Details Why [address] [facility]    [facility] EMERGENCY DEPT Emergency Medicine  If symptoms worsen [address] [facility] [address]  [phone]    [facility] Emergency Department Obstetrics And Gynecology Obstetrics & Gynecology, Obstetrics Schedule an appointment as soon as possible for a visit   [address] [place] [address]) [address] [phone]             Discharge Medication List as of 8/23/2022  9:18 PM        START taking these medications    Details   metroNIDAZOLE (FlagyL) 500 mg tablet Take 1 Tablet by mouth two (2) times a day for 7 days. , Normal, Disp-14 Tablet, R-0           CONTINUE these medications which have NOT CHANGED    Details   acetaminophen (TYLENOL) 325 mg tablet Take 2 Tablets by mouth every four (4) hours as needed for Pain., Normal, Disp-20 Tablet, R-0      prenatal vitamin (Prenatal) 28 mg iron- 800 mcg tab tablet Take 1 Tablet by mouth daily. , Normal, Disp-30 Tablet, R-0      OXcarbazepine (TRILEPTAL) 150 mg tablet Take 150 mg by mouth three (3) times daily. , Historical Med      traZODone (DESYREL) 50 mg tablet Take  by mouth nightly., Historical Med             Dictation disclaimer:  Please note that this dictation was completed with [employer], the computer voice recognition software. Quite often unanticipated grammatical, syntax, homophones, and other interpretive errors are inadvertently transcribed by the computer software. Please disregard these errors. Please excuse any errors that have escaped final proofreading.

## 2022-08-23 NOTE — ED TRIAGE NOTES
29 yo F to ED for abd cramps that has been going on for a few days. Pt is pregnant, about 8 weeks along. Dx with threatened miscarriage recently.  Pt has been taking tylenol for pain

## 2022-08-23 NOTE — Clinical Note
[facility] EMERGENCY DEPT  [address] [address] [phone]    Work/School Note    Date: 8/23/2022    To Whom It May concern:    Saran Taylor was seen and treated today in the emergency room by the following provider(s):  Attending Provider: Ranjit Conley MD  Physician Assistant: Román Yo PA-C. Saran Taylor is excused from work/school on 08/23/22 and 08/24/22. She is medically clear to return to work/school on 8/25/2022.        Sincerely,          Venessa Weiss PA-C

## 2022-08-23 NOTE — Clinical Note
[facility] EMERGENCY DEPT  [address] [address] [phone]    Work/School Note    Date: 8/23/2022    To Whom It May concern:    Jasiel Booker was seen and treated today in the emergency room by the following provider(s):  Attending Provider: Baljeet Julian MD  Physician Assistant: Warner Washington. Jasiel Booker is excused from work/school on 08/23/22 and 08/24/22. She is medically clear to return to work/school on 8/25/2022.        Sincerely,          MERCEDES Mcneil

## 2022-08-23 NOTE — DISCHARGE INSTRUCTIONS
Take medication as prescribed. Follow-up with your obstetrician within 2 days for reassessment. Bring the results from this visit with you for their review. Return to the ED immediately for any new, worsening, or persistent symptoms, including abdominal pain, vomiting, vaginal bleeding, or any other medical concerns.

## 2022-08-24 LAB
BACTERIA SPEC CULT: NORMAL
C TRACH RRNA SPEC QL NAA+PROBE: NEGATIVE
N GONORRHOEA RRNA SPEC QL NAA+PROBE: NEGATIVE
SERVICE CMNT-IMP: NORMAL
SPECIMEN SOURCE: NORMAL

## 2022-08-24 NOTE — ED NOTES
I have reviewed discharge instructions with the patient. The patient verbalized understanding. Discharge medications reviewed with patient and appropriate educational materials and side effects teaching were provided. Patient left ED ambulatory.

## 2022-08-24 NOTE — ED NOTES
1:30 PM    US IMPRESSION from 8/23/22  Single intrauterine pregnancy with average ultrasound age 8 weeks 3 days.  -Fetal heart rate 193 bpm  -Small-to-moderate amount of subchorionic hemorrhage, recommend attention on  follow-up. The left ovary cannot be visualized. Pt did not have abo-rh drawn or Rho-marcelina administered. Called and discussed with patient. Will return to the ED for rho-marcelina administration today.

## 2022-08-24 NOTE — ED NOTES
7:00 PM Assumed care of the pt at this time. Discussed with MERCEDES Stevenson concerning patient Fifi Maldonado, standard discussion of reason for visit, HPI, ROS, PE, and current results available. Recommendation for obtaining pending US followed by bedside re-evaluation to dispo the pt. Venessa Weiss PA-C     9:19 PM US resulted, single living IUP noted, moderate subchorionic hemorrhage seen, pt seen and re-evaluated, made aware of these results. Will plan to d/c with flagyl and close OBGYN follow-up. Return precautions advised. Pt agrees. Diana Weiss PA-C     Disposition: d/c    Dictation disclaimer:  Please note that this dictation was completed with [employer], the computer voice recognition software. Quite often unanticipated grammatical, syntax, homophones, and other interpretive errors are inadvertently transcribed by the computer software. Please disregard these errors. Please excuse any errors that have escaped final proofreading.

## 2022-08-30 ENCOUNTER — HOSPITAL ENCOUNTER (EMERGENCY)
Age: 27
Discharge: HOME | End: 2022-08-30
Attending: STUDENT IN AN ORGANIZED HEALTH CARE EDUCATION/TRAINING PROGRAM
Payer: MEDICAID

## 2022-08-30 VITALS
DIASTOLIC BLOOD PRESSURE: 70 MMHG | SYSTOLIC BLOOD PRESSURE: 116 MMHG | RESPIRATION RATE: 18 BRPM | TEMPERATURE: 98.4 F | HEART RATE: 68 BPM | OXYGEN SATURATION: 100 %

## 2022-08-30 DIAGNOSIS — L02.91 ABSCESS: Primary | ICD-10-CM

## 2022-08-30 DIAGNOSIS — N75.1 BARTHOLIN'S GLAND ABSCESS: ICD-10-CM

## 2022-08-30 PROCEDURE — 99283 EMERGENCY DEPT VISIT LOW MDM: CPT

## 2022-08-30 RX ORDER — AMOXICILLIN AND CLAVULANATE POTASSIUM 875; 125 MG/1; MG/1
1 TABLET, FILM COATED ORAL 2 TIMES DAILY
Qty: 14 TABLET | Refills: 0 | Status: SHIPPED | OUTPATIENT
Start: 2022-08-30 | End: 2022-10-12

## 2022-08-30 NOTE — Clinical Note
[address] [place] [address] Dr [facility] EMERGENCY DEPT  [address] [facility] [address] [phone]    Work/School Note    Date: 8/30/2022    To Whom It May concern:    Mirella May was seen and treated today in the emergency room by the following provider(s):  Attending Provider: Edouard Lai DO  Physician Assistant: Warner Jurado. Mirella May is excused from work/school on 8/30/2022 through 9/1/2022. She is medically clear to return to work/school on 9/2/2022.          Sincerely,          ARGENTINA Peraza

## 2022-08-30 NOTE — ED PROVIDER NOTES
EMERGENCY DEPARTMENT HISTORY AND PHYSICAL EXAM    2:17 PM      Date: 8/30/2022  Patient Name: Carson Trevizo    History of Presenting Illness     Chief Complaint   Patient presents with    Abscess         History Provided By: Patient    Additional History (Context): Carson Trevizo is a 28 y.o. female with  history of bipolar disorder, depression, and other noted past medical history  who presents with complaint of right inner thigh abscess x2 days. Patient notes she has applied warm compresses to site without relief. Patient denies fever or chills, drainage. Denies history of diabetes or MRSA. Pt denies any complications associated with pregnancy. Denies abdominal pain, flank pain, nausea or vomiting, dysuria, hematuria, vaginal bleeding, vaginal discharge. Notes she followed up with her obstetrician after her ED visit on the 23rd, no issues or complications. US performed at that visit demonstrated progression of pregnancy. PCP: None    Current Outpatient Medications   Medication Sig Dispense Refill    amoxicillin-clavulanate (Augmentin) 875-125 mg per tablet Take 1 Tablet by mouth two (2) times a day. 14 Tablet 0    metroNIDAZOLE (FlagyL) 500 mg tablet Take 1 Tablet by mouth two (2) times a day for 7 days. 14 Tablet 0    acetaminophen (TYLENOL) 325 mg tablet Take 2 Tablets by mouth every four (4) hours as needed for Pain. 20 Tablet 0    prenatal vitamin (Prenatal) 28 mg iron- 800 mcg tab tablet Take 1 Tablet by mouth daily. (Patient not taking: Reported on 7/20/2022) 30 Tablet 0    OXcarbazepine (TRILEPTAL) 150 mg tablet Take 150 mg by mouth three (3) times daily. (Patient not taking: Reported on 7/20/2022)      traZODone (DESYREL) 50 mg tablet Take  by mouth nightly.          Past History     Past Medical History:  Past Medical History:   Diagnosis Date    Bipolar 1 disorder ([address]     Bipolar 2 disorder ([facility] 8/3/2010    Borderline personality disorder ([facility]     Depression 8/3/2010    Major depression Psychiatric disorder     [facility]     PTSD (post-traumatic stress disorder) 8/3/2010    PTSD (post-traumatic stress disorder)     Schizophrenia ([facility]        Past Surgical History:  No past surgical history on file. Family History:  Family History   Problem Relation Age of Onset    Cancer Maternal Grandfather         throat cancer    Hypertension Maternal Uncle     Diabetes Maternal Grandmother     Hypertension Maternal Grandmother     Cancer Maternal Grandmother         cervical cancer    Diabetes Paternal Grandmother     Hypertension Paternal Grandfather        Social History:  Social History     Tobacco Use    Smoking status: Every Day     Packs/day: 0.25     Years: 1.00     Pack years: 0.25     Types: Cigarettes    Smokeless tobacco: Never   Substance Use Topics    Alcohol use: Yes     Alcohol/week: 0.0 standard drinks     Comment: occasionally    Drug use: Yes     Frequency: 7.0 times per week     Types: Marijuana     Comment: last used cocaine 6/2010       Allergies:  No Known Allergies      Review of Systems       Review of Systems   Constitutional:  Negative for chills and fever. Respiratory:  Negative for shortness of breath. Cardiovascular:  Negative for chest pain. Gastrointestinal:  Negative for abdominal pain, nausea and vomiting. Skin:  Positive for color change. Negative for rash. Neurological:  Negative for weakness. All other systems reviewed and are negative. Physical Exam   Visit Vitals  /70   Pulse 68   Temp 98.4 °F (36.9 °C)   Resp 18   SpO2 100%         Physical Exam  Vitals and nursing note reviewed. Constitutional:       General: She is not in acute distress. Appearance: Normal appearance. She is well-developed. She is not ill-appearing, toxic-appearing or diaphoretic. HENT:      Head: Normocephalic and atraumatic. Cardiovascular:      Rate and Rhythm: Normal rate and regular rhythm. Heart sounds: Normal heart sounds. No murmur heard.     No friction rub. No gallop. Pulmonary:      Effort: Pulmonary effort is normal. No respiratory distress. Breath sounds: Normal breath sounds. No wheezing or rales. Abdominal:      General: Abdomen is flat. There is no distension. Palpations: Abdomen is soft. Tenderness: There is no abdominal tenderness. There is no guarding or rebound. Musculoskeletal:         General: Normal range of motion. Cervical back: Normal range of motion and neck supple. Legs:    Skin:     General: Skin is warm. Findings: No rash. Neurological:      Mental Status: She is alert. Diagnostic Study Results     Labs -  No results found for this or any previous visit (from the past 12 hour(s)). Radiologic Studies -   No orders to display         Medical Decision Making   I am the first provider for this patient. I reviewed the vital signs, available nursing notes, past medical history, past surgical history, family history and social history. Vital Signs-Reviewed the patient's vital signs. Records Reviewed: Nursing Notes and Old Medical Records (Time of Review: 2:17 PM)    ED Course: Progress Notes, Reevaluation, and Consults:  11:30 AM: Discussed importance of I+D to treat abscess. Pt adamantly declines. Pt would like to try warm compresses, abx, and will return to the ED for any new or worsening symptoms. Discussed strict return precautions, including fever, increased swelling, or any other medical concerns. In agreement with plan. Discussed need for close outpatient follow-up with obstetrician this week for reassessment and treatment of bartholins abscess. Provider Notes (Medical Decision Making): 58-year-old female who presents to the ED due to right inner thigh abscess x2 days. Afebrile, nontoxic-appearing, looks well. Small region of induration without evidence of drainage or fluctuance. No extension into perineum or vaginal area. Patient adamantly declines I&D.   Discussed importance of warm compresses, antibiotics, close follow-up in 2 days for wound check. Strict return precautions provided      Diagnosis     Clinical Impression:   1. Abscess    2. Bartholin's gland abscess        Disposition: home     Follow-up Information       Follow up With Specialties Details Why [address]    [facility] EMERGENCY DEPT Emergency Medicine  If symptoms worsen [address] [address]  [address]  Schedule an appointment as soon as possible for a visit   [address]             Discharge Medication List as of 8/30/2022 11:56 AM        START taking these medications    Details   amoxicillin-clavulanate (Augmentin) 875-125 mg per tablet Take 1 Tablet by mouth two (2) times a day., Normal, Disp-14 Tablet, R-0           CONTINUE these medications which have NOT CHANGED    Details   metroNIDAZOLE (FlagyL) 500 mg tablet Take 1 Tablet by mouth two (2) times a day for 7 days. , Normal, Disp-14 Tablet, R-0      acetaminophen (TYLENOL) 325 mg tablet Take 2 Tablets by mouth every four (4) hours as needed for Pain., Normal, Disp-20 Tablet, R-0      prenatal vitamin (Prenatal) 28 mg iron- 800 mcg tab tablet Take 1 Tablet by mouth daily. , Normal, Disp-30 Tablet, R-0      OXcarbazepine (TRILEPTAL) 150 mg tablet Take 150 mg by mouth three (3) times daily. , Historical Med      traZODone (DESYREL) 50 mg tablet Take  by mouth nightly., Historical Med             Dictation disclaimer:  Please note that this dictation was completed with [employer], the [employer] voice recognition software. Quite often unanticipated grammatical, syntax, homophones, and other interpretive errors are inadvertently transcribed by the computer software. Please disregard these errors. Please excuse any errors that have escaped final proofreading.

## 2022-08-30 NOTE — ED TRIAGE NOTES
29 yo F to ED for abscess, reports she was prescribed keflex last time she was here and lost the prescription a couple days after taking.  Reports new abscess on inner thigh

## 2022-09-14 ENCOUNTER — HOSPITAL ENCOUNTER (EMERGENCY)
Age: 27
Discharge: HOME | End: 2022-09-14
Attending: EMERGENCY MEDICINE
Payer: MEDICAID

## 2022-09-14 VITALS
HEART RATE: 74 BPM | DIASTOLIC BLOOD PRESSURE: 74 MMHG | RESPIRATION RATE: 18 BRPM | TEMPERATURE: 98.5 F | SYSTOLIC BLOOD PRESSURE: 112 MMHG | OXYGEN SATURATION: 99 %

## 2022-09-14 DIAGNOSIS — R53.83 FATIGUE, UNSPECIFIED TYPE: Primary | ICD-10-CM

## 2022-09-14 LAB
COVID-19 RAPID TEST, COVR: NOT DETECTED
SOURCE, COVRS: NORMAL

## 2022-09-14 PROCEDURE — 87635 SARS-COV-2 COVID-19 AMP PRB: CPT

## 2022-09-14 PROCEDURE — 99283 EMERGENCY DEPT VISIT LOW MDM: CPT

## 2022-09-14 NOTE — Clinical Note
Leticia Messer was seen and treated in our emergency department on 9/14/2022. To whom it may concern:    Ms. Leticia Messer was seen in the ED on 9/14/2022 and may be excused from work for 1 week.      Sincerely,     CUAUHTEMOC Hinson MD

## 2022-09-14 NOTE — Clinical Note
Kristine Beach was seen and treated in our emergency department on 9/14/2022. To whom it may concern:    Ms. Kristine Beach was seen in the ED on 9/14/2022 and may be excused from work for 1 week.      Sincerely,     CUAUHTEMOC iSlva MD

## 2022-09-14 NOTE — ED PROVIDER NOTES
EMERGENCY DEPARTMENT HISTORY AND PHYSICAL EXAM    Date: 2022  Patient Name: Shahida Jaffe    History of Presenting Illness     Chief Complaint   Patient presents with    Fatigue         History Provided By: patient     Chief Complaint: fatigue  Duration: several days  Timing:  acute  Location: n/a  Quality: n/a  Severity: moderate  Modifying Factors: none   Associated Symptoms: none       Additional History (Context): Shahida Jaffe is a 28 y.o. female  estimated 13 weeks gestation with PMH Polar disorder, depression, borderline personality disorder, PTSD, and schizophrenia who presents with complaints of worsening fatigue over the past several days. 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 She states she does a lot of heavy lifting and standing on her feet. She believes this combined with her pregnancy is causing her to be extremely fatigued. She denies any known sick exposures. Denies vaginal bleeding, abdominal pain, chest pain, shortness of breath, fever, and chills. No other complaints reported. PCP: None    Current Outpatient Medications   Medication Sig Dispense Refill    amoxicillin-clavulanate (Augmentin) 875-125 mg per tablet Take 1 Tablet by mouth two (2) times a day. 14 Tablet 0    acetaminophen (TYLENOL) 325 mg tablet Take 2 Tablets by mouth every four (4) hours as needed for Pain. 20 Tablet 0    prenatal vitamin (Prenatal) 28 mg iron- 800 mcg tab tablet Take 1 Tablet by mouth daily. (Patient not taking: Reported on 2022) 30 Tablet 0    OXcarbazepine (TRILEPTAL) 150 mg tablet Take 150 mg by mouth three (3) times daily. (Patient not taking: Reported on 2022)      traZODone (DESYREL) 50 mg tablet Take  by mouth nightly.          Past History     Past Medical History:  Past Medical History:   Diagnosis Date    Bipolar 1 disorder ([facility]     Bipolar 2 disorder ([facility] 8/3/2010    Borderline personality disorder ([facility]     Depression 8/3/2010    Major depression     Psychiatric disorder     Psychosis ([facility]     PTSD (post-traumatic stress disorder) 8/3/2010    PTSD (post-traumatic stress disorder)     Schizophrenia ([facility]        Past Surgical History:  No past surgical history on file. Family History:  Family History   Problem Relation Age of Onset    Cancer Maternal Grandfather         throat cancer    Hypertension Maternal Uncle     Diabetes Maternal Grandmother     Hypertension Maternal Grandmother     Cancer Maternal Grandmother         cervical cancer    Diabetes Paternal Grandmother     Hypertension Paternal Grandfather        Social History:  Social History     Tobacco Use    Smoking status: Every Day     Packs/day: 0.25     Years: 1.00     Pack years: 0.25     Types: Cigarettes    Smokeless tobacco: Never   Substance Use Topics    Alcohol use: Yes     Alcohol/week: 0.0 standard drinks     Comment: occasionally    Drug use: Yes     Frequency: 7.0 times per week     Types: Marijuana     Comment: last used cocaine 6/2010       Allergies:  No Known Allergies      Review of Systems   Review of Systems   Constitutional:  Positive for fatigue. Negative for chills and fever. HENT: Negative. Negative for congestion, ear pain and rhinorrhea. Eyes: Negative. Negative for pain and redness. Respiratory: Negative. Negative for cough, shortness of breath, wheezing and stridor. Cardiovascular: Negative. Negative for chest pain and leg swelling. Gastrointestinal: Negative. Negative for abdominal pain, constipation, diarrhea, nausea and vomiting. Genitourinary: Negative. Negative for dysuria and frequency. Musculoskeletal: Negative. Negative for back pain and neck pain. Skin: Negative. Negative for rash and wound. Neurological: Negative. Negative for dizziness, seizures, syncope and headaches. All other systems reviewed and are negative.   All Other Systems Negative  Physical Exam     Vitals:    09/14/22 1806   BP: 112/74   Pulse: 74   Resp: 18   Temp: 98.5 °F (36.9 °C)   SpO2: 99%     Physical Exam  Vitals and nursing note reviewed. Constitutional:       General: She is not in acute distress. Appearance: She is well-developed. She is not diaphoretic. HENT:      Head: Normocephalic and atraumatic. Eyes:      General: No scleral icterus. Right eye: No discharge. Left eye: No discharge. Conjunctiva/sclera: Conjunctivae normal.   Cardiovascular:      Rate and Rhythm: Normal rate. Pulmonary:      Effort: Pulmonary effort is normal. No respiratory distress. Breath sounds: No stridor. Abdominal:      Palpations: Abdomen is soft. Comments: Bedside US shows +cardiac activity    Musculoskeletal:         General: Normal range of motion. Cervical back: Normal range of motion and neck supple. Skin:     General: Skin is warm and dry. Findings: No erythema or rash. Neurological:      General: No focal deficit present. Mental Status: She is alert and oriented to person, place, and time. Mental status is at baseline. Coordination: Coordination normal.      Comments: Gait is steady and patient exhibits no evidence of ataxia. Patient is able to ambulate without difficulty. No focal neurological deficit noted. No facial droop, slurred speech, or evidence of altered mentation noted on exam.       Psychiatric:         Behavior: Behavior normal.         Thought Content: Thought content normal.              Diagnostic Study Results     Labs -     Recent Results (from the past 12 hour(s))   COVID-19 RAPID TEST    Collection Time: 09/14/22  7:20 PM   Result Value Ref Range    Specimen source Nasopharyngeal      COVID-19 rapid test Not detected NOTD         Radiologic Studies -   No orders to display     CT Results  (Last 48 hours)      None          CXR Results  (Last 48 hours)      None              Medical Decision Making   I am the first provider for this patient.     I reviewed the vital signs, available nursing notes, past medical history, past surgical history, family history and social history. Vital Signs-Reviewed the patient's vital signs. Records Reviewed: Venessa Weiss PA-C     Procedures:  Procedures    Provider Notes (Medical Decision Making): Impression:  fatigue    COVID negative    Will plan to d/c with close PCP/OBGYN follow-up. Return precautions advised. Pt agrees. Venessa Weiss PA-C     Dictation disclaimer:  Please note that this dictation was completed with [employer], the computer voice recognition software. Quite often unanticipated grammatical, syntax, homophones, and other interpretive errors are inadvertently transcribed by the computer software. Please disregard these errors. Please excuse any errors that have escaped final proofreading. MED RECONCILIATION:  No current facility-administered medications for this encounter. Current Outpatient Medications   Medication Sig    amoxicillin-clavulanate (Augmentin) 875-125 mg per tablet Take 1 Tablet by mouth two (2) times a day. acetaminophen (TYLENOL) 325 mg tablet Take 2 Tablets by mouth every four (4) hours as needed for Pain.    prenatal vitamin (Prenatal) 28 mg iron- 800 mcg tab tablet Take 1 Tablet by mouth daily. (Patient not taking: Reported on 7/20/2022)    OXcarbazepine (TRILEPTAL) 150 mg tablet Take 150 mg by mouth three (3) times daily. (Patient not taking: Reported on 7/20/2022)    traZODone (DESYREL) 50 mg tablet Take  by mouth nightly. Disposition:  D/c    DISCHARGE NOTE:   Patient is stable for discharge at this time. I have discussed all the findings from today's work up with the patient, including lab results and imaging. I have answered all questions. Rx for ** given. Rest and close follow-up with the PCP recommended this week. Return to the ED immediately for any new or worsening symptoms.   Venessa Weiss PA-C     Follow-up Information       Follow up With Specialties Details Why [address] 3 days  [address]  [facility] [address] [facility] EMERGENCY DEPT Emergency Medicine  As needed, If symptoms worsen [address] [facility] [id number] [phone]            Current Discharge Medication List            Diagnosis     Clinical Impression:   1.  Fatigue, unspecified type

## 2022-09-14 NOTE — ED TRIAGE NOTES
Patient states works at [employer], lifts a lot at work and is tired States has been more tired than normal, and just wants a work note, Patient is 13 week pregnant, denies cramping or bleeding, denies complications.  Denies urinary complaints

## 2022-09-14 NOTE — DISCHARGE INSTRUCTIONS
[employer] Activation    Thank you for requesting access to [employer]. Please follow the instructions below to securely access and download your online medical record. [employer] allows you to send messages to your doctor, view your test results, renew your prescriptions, schedule appointments, and more. How Do I Sign Up? In your internet browser, go to [email or web address]  Click on the First Time User? Click Here link in the Sign In box. You will be redirect to the New Member Sign Up page. Enter your [employer] Access Code exactly as it appears below. You will not need to use this code after youve completed the sign-up process. If you do not sign up before the expiration date, you must request a new code. [employer] Access Code: Activation code not generated  Current [employer] Status: Active (This is the date your [employer] access code will )    Enter the last four digits of your Social Security Number (xxxx) and Date of Birth (mm/dd/yyyy) as indicated and click Submit. You will be taken to the next sign-up page. Create a [employer] ID. This will be your [employer] login ID and cannot be changed, so think of one that is secure and easy to remember. Create a [employer] password. You can change your password at any time. Enter your Password Reset Question and Answer. This can be used at a later time if you forget your password. Enter your e-mail address. You will receive e-mail notification when new information is available in [address] Click Sign Up. You can now view and download portions of your medical record. Click the [place] link to download a portable copy of your medical information. Additional Information    If you have questions, please visit the Frequently Asked Questions section of the [employer] website at [email or web address] Remember, [employer] is NOT to be used for urgent needs. For medical emergencies, dial 911.

## 2022-10-12 ENCOUNTER — HOSPITAL ENCOUNTER (EMERGENCY)
Age: 27
Discharge: HOME | End: 2022-10-12
Attending: EMERGENCY MEDICINE
Payer: MEDICAID

## 2022-10-12 ENCOUNTER — APPOINTMENT (OUTPATIENT)
Dept: ULTRASOUND IMAGING | Age: 27
End: 2022-10-12
Attending: PHYSICIAN ASSISTANT
Payer: MEDICAID

## 2022-10-12 VITALS
RESPIRATION RATE: 18 BRPM | WEIGHT: 167 LBS | HEART RATE: 79 BPM | TEMPERATURE: 98.6 F | HEIGHT: 66 IN | DIASTOLIC BLOOD PRESSURE: 63 MMHG | OXYGEN SATURATION: 97 % | SYSTOLIC BLOOD PRESSURE: 98 MMHG | BODY MASS INDEX: 26.84 KG/M2

## 2022-10-12 DIAGNOSIS — O26.892 ABDOMINAL PAIN DURING PREGNANCY IN SECOND TRIMESTER: Primary | ICD-10-CM

## 2022-10-12 DIAGNOSIS — R10.9 ABDOMINAL PAIN DURING PREGNANCY IN SECOND TRIMESTER: Primary | ICD-10-CM

## 2022-10-12 LAB
ALBUMIN SERPL-MCNC: 3 G/DL (ref 3.4–5)
ALBUMIN/GLOB SERPL: 0.9 {RATIO} (ref 0.8–1.7)
ALP SERPL-CCNC: 62 U/L (ref 45–117)
ALT SERPL-CCNC: 35 U/L (ref 13–56)
ANION GAP SERPL CALC-SCNC: 9 MMOL/L (ref 3–18)
APPEARANCE UR: CLEAR
AST SERPL-CCNC: 20 U/L (ref 10–38)
BASOPHILS # BLD: 0 K/UL (ref 0–0.1)
BASOPHILS NFR BLD: 0 % (ref 0–2)
BILIRUB SERPL-MCNC: 0.3 MG/DL (ref 0.2–1)
BILIRUB UR QL: NEGATIVE
BUN SERPL-MCNC: 8 MG/DL (ref 7–18)
BUN/CREAT SERPL: 13 (ref 12–20)
CALCIUM SERPL-MCNC: 8.9 MG/DL (ref 8.5–10.1)
CHLORIDE SERPL-SCNC: 109 MMOL/L (ref 100–111)
CO2 SERPL-SCNC: 20 MMOL/L (ref 21–32)
COLOR UR: YELLOW
CREAT SERPL-MCNC: 0.63 MG/DL (ref 0.6–1.3)
DIFFERENTIAL METHOD BLD: ABNORMAL
EOSINOPHIL # BLD: 0.4 K/UL (ref 0–0.4)
EOSINOPHIL NFR BLD: 4 % (ref 0–5)
ERYTHROCYTE [DISTWIDTH] IN BLOOD BY AUTOMATED COUNT: 14.3 % (ref 11.6–14.5)
GLOBULIN SER CALC-MCNC: 3.3 G/DL (ref 2–4)
GLUCOSE SERPL-MCNC: 88 MG/DL (ref 74–99)
GLUCOSE UR STRIP.AUTO-MCNC: NEGATIVE MG/DL
HCG SERPL-ACNC: 7404 MIU/ML (ref 0–10)
HCT VFR BLD AUTO: 32.9 % (ref 35–45)
HGB BLD-MCNC: 11.1 G/DL (ref 12–16)
HGB UR QL STRIP: NEGATIVE
IMM GRANULOCYTES # BLD AUTO: 0 K/UL (ref 0–0.04)
IMM GRANULOCYTES NFR BLD AUTO: 0 % (ref 0–0.5)
KETONES UR QL STRIP.AUTO: NEGATIVE MG/DL
LEUKOCYTE ESTERASE UR QL STRIP.AUTO: NEGATIVE
LYMPHOCYTES # BLD: 3.1 K/UL (ref 0.9–3.6)
LYMPHOCYTES NFR BLD: 33 % (ref 21–52)
MCH RBC QN AUTO: 28.2 PG (ref 24–34)
MCHC RBC AUTO-ENTMCNC: 33.7 G/DL (ref 31–37)
MCV RBC AUTO: 83.5 FL (ref 78–100)
MONOCYTES # BLD: 0.7 K/UL (ref 0.05–1.2)
MONOCYTES NFR BLD: 7 % (ref 3–10)
NEUTS SEG # BLD: 5.2 K/UL (ref 1.8–8)
NEUTS SEG NFR BLD: 55 % (ref 40–73)
NITRITE UR QL STRIP.AUTO: NEGATIVE
NRBC # BLD: 0 K/UL (ref 0–0.01)
NRBC BLD-RTO: 0 PER 100 WBC
PH UR STRIP: 6.5 [PH] (ref 5–8)
PLATELET # BLD AUTO: 282 K/UL (ref 135–420)
PMV BLD AUTO: 10.5 FL (ref 9.2–11.8)
POTASSIUM SERPL-SCNC: 3.7 MMOL/L (ref 3.5–5.5)
PROT SERPL-MCNC: 6.3 G/DL (ref 6.4–8.2)
PROT UR STRIP-MCNC: NEGATIVE MG/DL
RBC # BLD AUTO: 3.94 M/UL (ref 4.2–5.3)
SODIUM SERPL-SCNC: 138 MMOL/L (ref 136–145)
SP GR UR REFRACTOMETRY: 1.02 (ref 1–1.03)
UROBILINOGEN UR QL STRIP.AUTO: 1 EU/DL (ref 0.2–1)
WBC # BLD AUTO: 9.5 K/UL (ref 4.6–13.2)

## 2022-10-12 PROCEDURE — 85025 COMPLETE CBC W/AUTO DIFF WBC: CPT

## 2022-10-12 PROCEDURE — 81003 URINALYSIS AUTO W/O SCOPE: CPT

## 2022-10-12 PROCEDURE — 76815 OB US LIMITED FETUS(S): CPT

## 2022-10-12 PROCEDURE — 99284 EMERGENCY DEPT VISIT MOD MDM: CPT

## 2022-10-12 PROCEDURE — 84702 CHORIONIC GONADOTROPIN TEST: CPT

## 2022-10-12 PROCEDURE — 80053 COMPREHEN METABOLIC PANEL: CPT

## 2022-10-12 NOTE — ED TRIAGE NOTES
Pt states she is 4months pregnant and started having pinkish/red vaginal bleeding and cramping last night. No vaginal bleeding today just still cramping. Pt states her OB is Dr Suzy Garza at [facility] in [place].

## 2022-10-12 NOTE — Clinical Note
[address] [place] [address] Dr AFIA [facility] EMERGENCY DEPT  [id number] [facility] [address] [phone]    Work/School Note    Date: 10/12/2022    To Whom It May concern:    Nory Bennett was seen and treated today in the emergency room by the following provider(s):  Attending Provider: Margaret Bailey MD  Physician Assistant: Jonna Sanedrs PA-C. Nory Bennett is excused from work/school on 10/12/22 and 10/13/22. She is medically clear to return to work/school on 10/14/2022.        Sincerely,          Venessa Weiss PA-C

## 2022-10-12 NOTE — Clinical Note
[address] [place] [address] Dr [facility]IA [facility] EMERGENCY DEPT  [address] [address] [phone]    Work/School Note    Date: 10/12/2022    To Whom It May concern:    Lalo Martel was seen and treated today in the emergency room by the following provider(s):  Attending Provider: Katherine Sellers MD  Physician Assistant: Maxwell Antoine PA-C. Lalo Martel is excused from work/school on 10/12/2022 through 10/14/2022. She is medically clear to return to work/school on 10/15/2022.          Sincerely,          Venessa Weiss PA-C

## 2022-10-12 NOTE — Clinical Note
[address] [place] [address] Dr AFIA [facility] EMERGENCY DEPT  [address] [address] [phone]    Work/School Note    Date: 10/12/2022    To Whom It May concern:    Nory Bennett was seen and treated today in the emergency room by the following provider(s):  Attending Provider: Margaret Bailey MD  Physician Assistant: Jonna Sanders PA-C. Nory Bennett is excused from work/school on 10/12/2022 through 10/14/2022. She is medically clear to return to work/school on 10/15/2022.          Sincerely,          Venessa Weiss PA-C

## 2022-10-13 NOTE — DISCHARGE INSTRUCTIONS
[employer] Activation    Thank you for requesting access to [employer]. Please follow the instructions below to securely access and download your online medical record. [employer] allows you to send messages to your doctor, view your test results, renew your prescriptions, schedule appointments, and more. How Do I Sign Up? In your internet browser, go to [email or web address]  Click on the First Time User? Click Here link in the Sign In box. You will be redirect to the New Member Sign Up page. Enter your [employer] Access Code exactly as it appears below. You will not need to use this code after youve completed the sign-up process. If you do not sign up before the expiration date, you must request a new code. [employer] Access Code: Activation code not generated  Current [employer] Status: Active (This is the date your [employer] access code will )    Enter the last four digits of your Social Security Number (xxxx) and Date of Birth (mm/dd/yyyy) as indicated and click Submit. You will be taken to the next sign-up page. Create a [employer] ID. This will be your [employer] login ID and cannot be changed, so think of one that is secure and easy to remember. Create a [employer] password. You can change your password at any time. Enter your Password Reset Question and Answer. This can be used at a later time if you forget your password. Enter your e-mail address. You will receive e-mail notification when new information is available in [address] Click Sign Up. You can now view and download portions of your medical record. Click the [place] link to download a portable copy of your medical information. Additional Information    If you have questions, please visit the Frequently Asked Questions section of the [employer] website at [email or web address] Remember, [employer] is NOT to be used for urgent needs. For medical emergencies, dial 911.

## 2022-10-13 NOTE — ED PROVIDER NOTES
EMERGENCY DEPARTMENT HISTORY AND PHYSICAL EXAM    Date: 10/12/2022  Patient Name: Yoon Williamson    History of Presenting Illness     Chief Complaint   Patient presents with    Pregnancy Problem    Vaginal Bleeding    Abdominal Pain         History Provided By: patient     Chief Complaint: abd pain in pregnancy   Duration: few days  Timing:  acute  Location: abdomen   Quality: crampy  Severity: moderate  Modifying Factors: none   Associated Symptoms: abd cramping and intermittent spotting       Additional History (Context): Yoon Williamson is a 28 y.o. female , estimated 16 weeks gestation with PMH schizophrenia, PTSD, bipolar disorder, depression, and borderline personality disorder who presents with c/o a few days of abd cramping and intermittent spotting. Pt states she is not currently bleeding. Denies fever, chills, and vaginal d/c. No other complaints reported at this time. PCP: None    Current Outpatient Medications   Medication Sig Dispense Refill    prenatal vitamin (Prenatal) 28 mg iron- 800 mcg tab tablet Take 1 Tablet by mouth daily. 30 Tablet 0       Past History     Past Medical History:  Past Medical History:   Diagnosis Date    Bipolar 1 disorder ([facility]     Bipolar 2 disorder ([facility] 8/3/2010    Borderline personality disorder ([facility]     Depression 8/3/2010    Major depression     Psychiatric disorder     Psychosis ([facility]     PTSD (post-traumatic stress disorder) 8/3/2010    PTSD (post-traumatic stress disorder)     Schizophrenia ([facility]        Past Surgical History:  No past surgical history on file.     Family History:  Family History   Problem Relation Age of Onset    Cancer Maternal Grandfather         throat cancer    Hypertension Maternal Uncle     Diabetes Maternal Grandmother     Hypertension Maternal Grandmother     Cancer Maternal Grandmother         cervical cancer    Diabetes Paternal Grandmother     Hypertension Paternal Grandfather        Social History:  Social History     Tobacco Use Smoking status: Every Day     Packs/day: 0.25     Years: 1.00     Pack years: 0.25     Types: Cigarettes    Smokeless tobacco: Never   Substance Use Topics    Alcohol use: Yes     Alcohol/week: 0.0 standard drinks     Comment: occasionally    Drug use: Yes     Frequency: 7.0 times per week     Types: Marijuana     Comment: last used cocaine 6/2010       Allergies:  No Known Allergies      Review of Systems   Review of Systems   Constitutional: Negative. Negative for chills and fever. HENT: Negative. Negative for congestion, ear pain and rhinorrhea. Eyes: Negative. Negative for pain and redness. Respiratory: Negative. Negative for cough, shortness of breath, wheezing and stridor. Cardiovascular: Negative. Negative for chest pain and leg swelling. Gastrointestinal:  Positive for abdominal pain. Negative for constipation, diarrhea, nausea and vomiting. Genitourinary:  Positive for vaginal bleeding. Negative for dysuria and frequency. Musculoskeletal: Negative. Negative for back pain and neck pain. Skin: Negative. Negative for rash and wound. Neurological: Negative. Negative for dizziness, seizures, syncope and headaches. All other systems reviewed and are negative. All Other Systems Negative  Physical Exam     Vitals:    10/12/22 1937   BP: 98/63   Pulse: 79   Resp: 18   Temp: 98.6 °F (37 °C)   SpO2: 97%   Weight: 75.8 kg (167 lb)   Height: 5' 6\" (1.676 m)     Physical Exam  Vitals and nursing note reviewed. Constitutional:       General: She is not in acute distress. Appearance: She is well-developed. She is not diaphoretic. HENT:      Head: Normocephalic and atraumatic. Eyes:      General: No scleral icterus. Right eye: No discharge. Left eye: No discharge. Conjunctiva/sclera: Conjunctivae normal.   Cardiovascular:      Rate and Rhythm: Normal rate and regular rhythm. Heart sounds: Normal heart sounds. No murmur heard. No friction rub. No gallop. Pulmonary:      Effort: Pulmonary effort is normal. No respiratory distress. Breath sounds: Normal breath sounds. No stridor. No wheezing, rhonchi or rales. Abdominal:      General: Bowel sounds are normal.      Palpations: Abdomen is soft. Tenderness: There is no abdominal tenderness. There is no guarding. Comments: Bedside US performed, living IUP with + cardiac activity and fetal movement noted,    Musculoskeletal:         General: Normal range of motion. Cervical back: Normal range of motion and neck supple. Skin:     General: Skin is warm and dry. Findings: No erythema or rash. Neurological:      General: No focal deficit present. Mental Status: She is alert and oriented to person, place, and time. Mental status is at baseline. Coordination: Coordination normal.      Comments: Gait is steady and patient exhibits no evidence of ataxia. Patient is able to ambulate without difficulty. No focal neurological deficit noted. No facial droop, slurred speech, or evidence of altered mentation noted on exam.       Psychiatric:         Behavior: Behavior normal.         Thought Content: Thought content normal.            Diagnostic Study Results     Labs -     Recent Results (from the past 12 hour(s))   CBC WITH AUTOMATED DIFF    Collection Time: 10/12/22  7:45 PM   Result Value Ref Range    WBC 9.5 4.6 - 13.2 K/uL    RBC 3.94 (L) 4.20 - 5.30 M/uL    HGB 11.1 (L) 12.0 - 16.0 g/dL    HCT 32.9 (L) 35.0 - 45.0 %    MCV 83.5 78.0 - 100.0 FL    MCH 28.2 24.0 - 34.0 PG    MCHC 33.7 31.0 - 37.0 g/dL    RDW 14.3 11.6 - 14.5 %    PLATELET 007 080 - 443 K/uL    MPV 10.5 9.2 - 11.8 FL    NRBC 0.0 0  WBC    ABSOLUTE NRBC 0.00 0.00 - 0.01 K/uL    NEUTROPHILS 55 40 - 73 %    LYMPHOCYTES 33 21 - 52 %    MONOCYTES 7 3 - 10 %    EOSINOPHILS 4 0 - 5 %    BASOPHILS 0 0 - 2 %    IMMATURE GRANULOCYTES 0 0.0 - 0.5 %    ABS. NEUTROPHILS 5.2 1.8 - 8.0 K/UL    ABS.  LYMPHOCYTES 3.1 0.9 - 3.6 K/UL    ABS. MONOCYTES 0.7 0.05 - 1.2 K/UL    ABS. EOSINOPHILS 0.4 0.0 - 0.4 K/UL    ABS. BASOPHILS 0.0 0.0 - 0.1 K/UL    ABS. IMM. GRANS. 0.0 0.00 - 0.04 K/UL    DF AUTOMATED     METABOLIC PANEL, COMPREHENSIVE    Collection Time: 10/12/22  7:45 PM   Result Value Ref Range    Sodium 138 136 - 145 mmol/L    Potassium 3.7 3.5 - 5.5 mmol/L    Chloride 109 100 - 111 mmol/L    CO2 20 (L) 21 - 32 mmol/L    Anion gap 9 3.0 - 18 mmol/L    Glucose 88 74 - 99 mg/dL    BUN 8 7.0 - 18 MG/DL    Creatinine 0.63 0.6 - 1.3 MG/DL    BUN/Creatinine ratio 13 12 - 20      eGFR >60 >60 ml/min/1.73m2    Calcium 8.9 8.5 - 10.1 MG/DL    Bilirubin, total 0.3 0.2 - 1.0 MG/DL    ALT (SGPT) 35 13 - 56 U/L    AST (SGOT) 20 10 - 38 U/L    Alk. phosphatase 62 45 - 117 U/L    Protein, total 6.3 (L) 6.4 - 8.2 g/dL    Albumin 3.0 (L) 3.4 - 5.0 g/dL    Globulin 3.3 2.0 - 4.0 g/dL    A-G Ratio 0.9 0.8 - 1.7     BETA HCG, QT    Collection Time: 10/12/22  7:45 PM   Result Value Ref Range    Beta HCG, QT 7,404 (H) 0 - 10 MIU/ML   URINALYSIS W/ RFLX MICROSCOPIC    Collection Time: 10/12/22  7:45 PM   Result Value Ref Range    Color YELLOW      Appearance CLEAR      Specific gravity 1.025 1.005 - 1.030      pH (UA) 6.5 5.0 - 8.0      Protein Negative NEG mg/dL    Glucose Negative NEG mg/dL    Ketone Negative NEG mg/dL    Bilirubin Negative NEG      Blood Negative NEG      Urobilinogen 1.0 0.2 - 1.0 EU/dL    Nitrites Negative NEG      Leukocyte Esterase Negative NEG         Radiologic Studies -   US OB LTD W DOPPLER   Final Result      There is a single living intrauterine gestation whose average ultrasound age is   17 weeks 2 days. No abnormality. Right ovary not demonstrated due to bowel gas shadowing. CT Results  (Last 48 hours)      None          CXR Results  (Last 48 hours)      None              Medical Decision Making   I am the first provider for this patient.     I reviewed the vital signs, available nursing notes, past medical history, past surgical history, family history and social history. Vital Signs-Reviewed the patient's vital signs. Records Reviewed: Venessa Weiss PA-C     Procedures:  Procedures    Provider Notes (Medical Decision Making): Impression:  abd pain in pregnancy     Labs: hgb 11.1, hct 88.3, metabolic panel unremarkable, hcg 7404, US shows living IUP, 17 weeks 2 days  UA negative    Bedside ultrasound showed normal cardiac activity and fetal movement. Fetal heart rate 152. I discussed all these results with the patient at bedside. We will plan discharge patient with recommendation for close OB/GYN follow-up. Return precautions advised. Patient agrees. Venessa Weiss PA-C     Dictation disclaimer:  Please note that this dictation was completed with [employer] [place], the [employer] voice recognition software. Quite often unanticipated grammatical, syntax, homophones, and other interpretive errors are inadvertently transcribed by the computer software. Please disregard these errors. Please excuse any errors that have escaped final proofreading. MED RECONCILIATION:  No current facility-administered medications for this encounter. Current Outpatient Medications   Medication Sig    prenatal vitamin (Prenatal) 28 mg iron- 800 mcg tab tablet Take 1 Tablet by mouth daily. Disposition:  D/c    DISCHARGE NOTE:   Patient is stable for discharge at this time. I have discussed all the findings from today's work up with the patient, including lab results and imaging. I have answered all questions. No new rx given. Rest and close follow-up with the OBGYN recommended this week. Return to the ED immediately for any new or worsening symptoms.       Follow-up Information       Follow up With Specialties Details Why Contact Info    your OBGYN  In 3 days      [facility] EMERGENCY DEPT Emergency Medicine  As needed, If symptoms worsen [address]  [phone]            Current Discharge Medication List Diagnosis     Clinical Impression:   1.  Abdominal pain during pregnancy in second trimester

## 2022-12-03 ENCOUNTER — HOSPITAL ENCOUNTER (EMERGENCY)
Age: 27
Discharge: HOME | End: 2022-12-03
Attending: EMERGENCY MEDICINE
Payer: COMMERCIAL

## 2022-12-03 VITALS
TEMPERATURE: 98 F | RESPIRATION RATE: 20 BRPM | HEART RATE: 83 BPM | OXYGEN SATURATION: 99 % | SYSTOLIC BLOOD PRESSURE: 111 MMHG | DIASTOLIC BLOOD PRESSURE: 70 MMHG

## 2022-12-03 DIAGNOSIS — O26.899 ABDOMINAL PAIN AFFECTING PREGNANCY: Primary | ICD-10-CM

## 2022-12-03 DIAGNOSIS — R10.9 ABDOMINAL PAIN AFFECTING PREGNANCY: Primary | ICD-10-CM

## 2022-12-03 LAB
ALBUMIN SERPL-MCNC: 2.8 G/DL (ref 3.4–5)
ALBUMIN/GLOB SERPL: 0.7 {RATIO} (ref 0.8–1.7)
ALP SERPL-CCNC: 78 U/L (ref 45–117)
ALT SERPL-CCNC: 30 U/L (ref 13–56)
ANION GAP SERPL CALC-SCNC: 6 MMOL/L (ref 3–18)
AST SERPL-CCNC: 15 U/L (ref 10–38)
BASOPHILS # BLD: 0 K/UL (ref 0–0.1)
BASOPHILS NFR BLD: 0 % (ref 0–2)
BILIRUB SERPL-MCNC: 0.3 MG/DL (ref 0.2–1)
BUN SERPL-MCNC: 6 MG/DL (ref 7–18)
BUN/CREAT SERPL: 10 (ref 12–20)
CALCIUM SERPL-MCNC: 9 MG/DL (ref 8.5–10.1)
CHLORIDE SERPL-SCNC: 109 MMOL/L (ref 100–111)
CO2 SERPL-SCNC: 23 MMOL/L (ref 21–32)
CREAT SERPL-MCNC: 0.58 MG/DL (ref 0.6–1.3)
DIFFERENTIAL METHOD BLD: ABNORMAL
EOSINOPHIL # BLD: 0.2 K/UL (ref 0–0.4)
EOSINOPHIL NFR BLD: 2 % (ref 0–5)
ERYTHROCYTE [DISTWIDTH] IN BLOOD BY AUTOMATED COUNT: 14.6 % (ref 11.6–14.5)
GLOBULIN SER CALC-MCNC: 4.2 G/DL (ref 2–4)
GLUCOSE SERPL-MCNC: 90 MG/DL (ref 74–99)
HCG SERPL-ACNC: 2633 MIU/ML (ref 0–10)
HCT VFR BLD AUTO: 35 % (ref 35–45)
HGB BLD-MCNC: 11.8 G/DL (ref 12–16)
IMM GRANULOCYTES # BLD AUTO: 0 K/UL (ref 0–0.04)
IMM GRANULOCYTES NFR BLD AUTO: 0 % (ref 0–0.5)
LYMPHOCYTES # BLD: 2.4 K/UL (ref 0.9–3.6)
LYMPHOCYTES NFR BLD: 22 % (ref 21–52)
MCH RBC QN AUTO: 27.6 PG (ref 24–34)
MCHC RBC AUTO-ENTMCNC: 33.7 G/DL (ref 31–37)
MCV RBC AUTO: 82 FL (ref 78–100)
MONOCYTES # BLD: 0.7 K/UL (ref 0.05–1.2)
MONOCYTES NFR BLD: 6 % (ref 3–10)
NEUTS SEG # BLD: 8 K/UL (ref 1.8–8)
NEUTS SEG NFR BLD: 70 % (ref 40–73)
NRBC # BLD: 0 K/UL (ref 0–0.01)
NRBC BLD-RTO: 0 PER 100 WBC
PLATELET # BLD AUTO: 239 K/UL (ref 135–420)
PMV BLD AUTO: 10.7 FL (ref 9.2–11.8)
POTASSIUM SERPL-SCNC: 3.8 MMOL/L (ref 3.5–5.5)
PROT SERPL-MCNC: 7 G/DL (ref 6.4–8.2)
RBC # BLD AUTO: 4.27 M/UL (ref 4.2–5.3)
SODIUM SERPL-SCNC: 138 MMOL/L (ref 136–145)
WBC # BLD AUTO: 11.4 K/UL (ref 4.6–13.2)

## 2022-12-03 PROCEDURE — 84702 CHORIONIC GONADOTROPIN TEST: CPT

## 2022-12-03 PROCEDURE — 85025 COMPLETE CBC W/AUTO DIFF WBC: CPT

## 2022-12-03 PROCEDURE — 99283 EMERGENCY DEPT VISIT LOW MDM: CPT

## 2022-12-03 PROCEDURE — 80053 COMPREHEN METABOLIC PANEL: CPT

## 2022-12-03 NOTE — Clinical Note
[address] [place] [address] Dr [facility] EMERGENCY DEPT  [id number] [facility] [address] [phone]    Work/School Note    Date: 12/3/2022    To Whom It May concern:    Sonia Galloway was seen and treated today in the emergency room by the following provider(s):  Attending Provider: Kareem Esparza MD  Physician Assistant: Warner Lala. Sonia Galloway is excused from work/school on 12/03/22 and 12/04/22. She is medically clear to return to work/school on 12/5/2022.        Sincerely,          Jodene Goodpasture, PA

## 2022-12-03 NOTE — Clinical Note
[address] [place] [address] [address] [facility] EMERGENCY DEPT  [id number] [address] [phone]    Work/School Note    Date: 12/3/2022    To Whom It May concern:    Nay Darnell was seen and treated today in the emergency room by the following provider(s):  Attending Provider: Kenan Buchanan MD  Physician Assistant: Avery Lorenz, [facility] Alia Tubbs. Nay Darnell is excused from work/school on 12/03/22 and 12/04/22. She is medically clear to return to work/school on 12/5/2022.        Sincerely,          MERCEDES Gallegos

## 2022-12-03 NOTE — ED TRIAGE NOTES
Pt. Arrives to the ED reporting she is 6 months pregnant and has not felt the baby move in over 24 hours. Pt. Reports not calling her OBGYN.

## 2022-12-04 NOTE — ED PROVIDER NOTES
EMERGENCY DEPARTMENT HISTORY AND PHYSICAL EXAM    7:18 PM      Date: 12/3/2022  Patient Name: Tim Mariee    History of Presenting Illness     Chief Complaint   Patient presents with    Abdominal Pain       History Provided By: Patient    Additional History (Context): Tim Mariee is a 28 y.o. female with  hx of psychosis, bipolar d/o, depression, PTSD, and other noted PMH  who presents with concern for no fetal movement x 24 hours. Pt notes she is 24 weeks and 1 day, , obstetrician is Dr. Mckenzie Shaver, unknown where she is delivering, Dedra Gonzalez is providing group. Pt notes mild abdominal cramping and intermittent nausea and vomiting which she has had intermittently throughout pregnancy. Denies fever/chills, diarrhea, dysuria, hematuria, vaginal bleeding or discharge, rush of fluids. Pt notes she had a recent ultrasound 2 weeks ago that \"looked good\". Unknown LMP    PCP: None    Current Outpatient Medications   Medication Sig Dispense Refill    prenatal vitamin (Prenatal) 28 mg iron- 800 mcg tab tablet Take 1 Tablet by mouth daily. 30 Tablet 0       Past History     Past Medical History:  Past Medical History:   Diagnosis Date    Bipolar 1 disorder ([facility]     Bipolar 2 disorder ([facility] 8/3/2010    Borderline personality disorder ([facility]     Depression 8/3/2010    Major depression     Psychiatric disorder     Psychosis ([facility]     PTSD (post-traumatic stress disorder) 8/3/2010    PTSD (post-traumatic stress disorder)     Schizophrenia ([facility]        Past Surgical History:  No past surgical history on file.     Family History:  Family History   Problem Relation Age of Onset    Cancer Maternal Grandfather         throat cancer    Hypertension Maternal Uncle     Diabetes Maternal Grandmother     Hypertension Maternal Grandmother     Cancer Maternal Grandmother         cervical cancer    Diabetes Paternal Grandmother     Hypertension Paternal Grandfather        Social History:  Social History     Tobacco Use Smoking status: Every Day     Packs/day: 0.25     Years: 1.00     Pack years: 0.25     Types: Cigarettes    Smokeless tobacco: Never   Substance Use Topics    Alcohol use: Yes     Alcohol/week: 0.0 standard drinks     Comment: occasionally    Drug use: Yes     Frequency: 7.0 times per week     Types: Marijuana     Comment: last used cocaine 6/2010       Allergies:  No Known Allergies      Review of Systems       Review of Systems   Constitutional:  Negative for chills and fever. Respiratory:  Negative for shortness of breath. Cardiovascular:  Negative for chest pain. Gastrointestinal:  Positive for abdominal pain, nausea and vomiting. Skin:  Negative for rash. Neurological:  Negative for weakness. All other systems reviewed and are negative. Physical Exam   Visit Vitals  /70   Pulse 83   Temp 98 °F (36.7 °C)   Resp 20   LMP  (LMP Unknown)   SpO2 99%         Physical Exam  Vitals and nursing note reviewed. Constitutional:       General: She is not in acute distress. Appearance: She is well-developed. She is not ill-appearing, toxic-appearing or diaphoretic. HENT:      Head: Normocephalic and atraumatic. Cardiovascular:      Rate and Rhythm: Normal rate and regular rhythm. Heart sounds: Normal heart sounds. No murmur heard. No friction rub. No gallop. Pulmonary:      Effort: Pulmonary effort is normal. No respiratory distress. Breath sounds: Normal breath sounds. No wheezing or rales. Abdominal:      General: Abdomen is flat. Bowel sounds are normal.      Palpations: Abdomen is soft. Tenderness: There is no abdominal tenderness. There is no right CVA tenderness, left CVA tenderness, guarding or rebound. Comments: Gravid to umbilicus   Musculoskeletal:         General: Normal range of motion. Cervical back: Normal range of motion and neck supple. Skin:     General: Skin is warm. Findings: No rash.    Neurological:      Mental Status: She is alert.         Diagnostic Study Results     Labs -  Recent Results (from the past 12 hour(s))   CBC WITH AUTOMATED DIFF    Collection Time: 12/03/22  7:15 PM   Result Value Ref Range    WBC 11.4 4.6 - 13.2 K/uL    RBC 4.27 4.20 - 5.30 M/uL    HGB 11.8 (L) 12.0 - 16.0 g/dL    HCT 35.0 35.0 - 45.0 %    MCV 82.0 78.0 - 100.0 FL    MCH 27.6 24.0 - 34.0 PG    MCHC 33.7 31.0 - 37.0 g/dL    RDW 14.6 (H) 11.6 - 14.5 %    PLATELET 490 533 - 199 K/uL    MPV 10.7 9.2 - 11.8 FL    NRBC 0.0 0  WBC    ABSOLUTE NRBC 0.00 0.00 - 0.01 K/uL    NEUTROPHILS 70 40 - 73 %    LYMPHOCYTES 22 21 - 52 %    MONOCYTES 6 3 - 10 %    EOSINOPHILS 2 0 - 5 %    BASOPHILS 0 0 - 2 %    IMMATURE GRANULOCYTES 0 0.0 - 0.5 %    ABS. NEUTROPHILS 8.0 1.8 - 8.0 K/UL    ABS. LYMPHOCYTES 2.4 0.9 - 3.6 K/UL    ABS. MONOCYTES 0.7 0.05 - 1.2 K/UL    ABS. EOSINOPHILS 0.2 0.0 - 0.4 K/UL    ABS. BASOPHILS 0.0 0.0 - 0.1 K/UL    ABS. IMM. GRANS. 0.0 0.00 - 0.04 K/UL    DF AUTOMATED     METABOLIC PANEL, COMPREHENSIVE    Collection Time: 12/03/22  7:15 PM   Result Value Ref Range    Sodium 138 136 - 145 mmol/L    Potassium 3.8 3.5 - 5.5 mmol/L    Chloride 109 100 - 111 mmol/L    CO2 23 21 - 32 mmol/L    Anion gap 6 3.0 - 18 mmol/L    Glucose 90 74 - 99 mg/dL    BUN 6 (L) 7.0 - 18 MG/DL    Creatinine 0.58 (L) 0.6 - 1.3 MG/DL    BUN/Creatinine ratio 10 (L) 12 - 20      eGFR >60 >60 ml/min/1.73m2    Calcium 9.0 8.5 - 10.1 MG/DL    Bilirubin, total 0.3 0.2 - 1.0 MG/DL    ALT (SGPT) 30 13 - 56 U/L    AST (SGOT) 15 10 - 38 U/L    Alk. phosphatase 78 45 - 117 U/L    Protein, total 7.0 6.4 - 8.2 g/dL    Albumin 2.8 (L) 3.4 - 5.0 g/dL    Globulin 4.2 (H) 2.0 - 4.0 g/dL    A-G Ratio 0.7 (L) 0.8 - 1.7     BETA HCG, QT    Collection Time: 12/03/22  7:15 PM   Result Value Ref Range    Beta HCG, QT 2,633 (H) 0 - 10 MIU/ML       Radiologic Studies -   No orders to display         Medical Decision Making   I am the first provider for this patient.     I reviewed the vital signs, available nursing notes, past medical history, past surgical history, family history and social history. Vital Signs-Reviewed the patient's vital signs. Records Reviewed: Nursing Notes and Old Medical Records (Time of Review: 7:18 PM)    ED Course: Progress Notes, Reevaluation, and Consults:  7:00 PM: Dr. Kike Benito and I at bedside to examine patient. , ultrasound demonstrates good fetal movements, good cardiac activity. 8:00 PM: Discussed care with Dr. Nate Beckwith, ob-gyn at [place], recommends close outpatient follow-up in office. Does not feel patient would need transfer for monitoring if FHT normal and bedside ultrasound with fetal movements. Pt in agreement with plan, notes baby has been moving \"since the cold gel and ultrasound\"    Reviewed results and plan with patient. Discussed need for close outpatient follow-up with obstetrician this week for reassessment. Discussed strict return precautions, including vomiting, vaginal bleeding, reduced fetal movements, or any other medical concerns. Pt in agreement with plan. Provider Notes (Medical Decision Making):  29 yo F who presents to the ED due to no fetal movement x24 hours. Patient is 24 weeks pregnant. Notes lower abdominal cramping and nausea and vomiting which she has had throughout pregnancy. Afebrile, nontoxic-appearing, looks well. Abdomen soft and nontender to palpation. Fetal heart tones 161, bedside ultrasound demonstrates good fetal movements, good cardiac activity. Patient notes after the ultrasound she felt baby moving. Discussed with OB/GYN at [place]. Does not feel patient will need transfer for monitoring based on examination findings today. Will discharge with close outpatient follow-up, strict return precautions for any new or worsening symptoms        Diagnosis     Clinical Impression:   1.  Abdominal pain affecting pregnancy        Disposition: home     Follow-up Information       Follow up With Specialties Details Why Contact Info    [facility] EMERGENCY DEPT Emergency Medicine  If symptoms worsen [address] [address]  [address] , [facility] AND [facility] Obstetrics & Gynecology Schedule an appointment as soon as possible for a visit   [address] [facility], [address]  [phone]             Discharge Medication List as of 12/3/2022  8:19 PM        CONTINUE these medications which have NOT CHANGED    Details   prenatal vitamin (Prenatal) 28 mg iron- 800 mcg tab tablet Take 1 Tablet by mouth daily. , Normal, Disp-30 Tablet, R-0             Dictation disclaimer:  Please note that this dictation was completed with [employer], the computer voice recognition software. Quite often unanticipated grammatical, syntax, homophones, and other interpretive errors are inadvertently transcribed by the computer software. Please disregard these errors. Please excuse any errors that have escaped final proofreading.

## 2022-12-04 NOTE — DISCHARGE INSTRUCTIONS
Follow-up with your obstetrician within 2 days for reassessment. Bring the results from this visit with you for their review. Return to the ED immediately for any new, worsening, or persistent symptoms, including fever, vomiting, vaginal bleeding, reduced fetal movements, or any other medical concerns.

## 2023-11-19 NOTE — DISCHARGE INSTRUCTIONS
Take medication as prescribed. WARM COMPRESSES TO SITE MULTIPLE TIMES A DAY. Follow-up with your ob-gyn physician within 2 days for reassessment. Bring the results from this visit with you for their review. Return to the ED immediately for any new, worsening, or persistent symptoms, including fever, increased swelling, or any other medical concerns.
puples round and reactive. dilated.

## 2025-02-11 ENCOUNTER — HOSPITAL ENCOUNTER (EMERGENCY)
Facility: HOSPITAL | Age: 30
Discharge: HOME | End: 2025-02-11

## 2025-02-11 VITALS
RESPIRATION RATE: 18 BRPM | TEMPERATURE: 97.5 F | SYSTOLIC BLOOD PRESSURE: 127 MMHG | DIASTOLIC BLOOD PRESSURE: 88 MMHG | OXYGEN SATURATION: 100 % | HEART RATE: 88 BPM

## 2025-02-11 DIAGNOSIS — R21 RASH AND NONSPECIFIC SKIN ERUPTION: Primary | ICD-10-CM

## 2025-02-11 PROCEDURE — 6370000000 HC RX 637 (ALT 250 FOR IP): Performed by: PHYSICIAN ASSISTANT

## 2025-02-11 PROCEDURE — 99283 EMERGENCY DEPT VISIT LOW MDM: CPT

## 2025-02-11 RX ORDER — METHYLPREDNISOLONE 4 MG/1
TABLET ORAL
Qty: 1 KIT | Refills: 0 | Status: SHIPPED | OUTPATIENT
Start: 2025-02-11

## 2025-02-11 RX ORDER — VALACYCLOVIR HYDROCHLORIDE 500 MG/1
1000 TABLET, FILM COATED ORAL
Status: COMPLETED | OUTPATIENT
Start: 2025-02-11 | End: 2025-02-11

## 2025-02-11 RX ORDER — VALACYCLOVIR HYDROCHLORIDE 1 G/1
1000 TABLET, FILM COATED ORAL 3 TIMES DAILY
Qty: 30 TABLET | Refills: 0 | Status: SHIPPED | OUTPATIENT
Start: 2025-02-11 | End: 2025-02-21

## 2025-02-11 RX ADMIN — VALACYCLOVIR HYDROCHLORIDE 1000 MG: 500 TABLET, FILM COATED ORAL at 18:41

## 2025-02-11 NOTE — ED PROVIDER NOTES
[facility]  EMERGENCY DEPARTMENT ENCOUNTER       Pt Name: Shreya Abraham  MRN: [id number]  Birthdate 1995  Date of evaluation: 2/11/2025  PCP: None, None  Note Started: 8:11 PM 2/11/25     CHIEF COMPLAINT       Chief Complaint   Patient presents with    Rash        HISTORY OF PRESENT ILLNESS: 1 or more elements      History From: Patient  HPI Limitations: None  Chronic Conditions: bipolar, PTSD, schizophrenia   Social Determinants affecting Dx or Tx: none      Shreya Abraham is a 29 y.o. female who presents to ED c/o rash to left side of neck/left shoulder. Pt notes red blistery rash x 2 weeks. Pt notes initially there were a few bumps at the site but now more bumps and area is burning and itchy. No relief with topical alcohol or vaseline. Pt denies hx of zoster. Otherwise in normal state of health.      Nursing Notes were all reviewed and agreed with or any disagreements were addressed in the HPI.    PAST HISTORY     Past Medical History:  Past Medical History:   Diagnosis Date    Bipolar 1 disorder (HCC)     Bipolar 2 disorder (HCC) 8/3/2010    Borderline personality disorder (HCC)     Depression 8/3/2010    Major depression     Psychiatric disorder     Psychosis ([facility])     PTSD (post-traumatic stress disorder)     PTSD (post-traumatic stress disorder) 8/3/2010    Schizophrenia (HCC)        Past Surgical History:  No past surgical history on file.    Family History:  Family History   Problem Relation Age of Onset    Diabetes Paternal Grandmother     Hypertension Paternal Grandfather     Cancer Maternal Grandmother         cervical cancer    Hypertension Maternal Grandmother     Diabetes Maternal Grandmother     Hypertension Maternal Uncle     Cancer Maternal Grandfather         throat cancer       Social History:  Social History     Socioeconomic History    Marital status: Single   Tobacco Use    Smoking status: Every Day     Current packs/day: 0.25     Types: Cigarettes    Smokeless

## 2025-02-11 NOTE — ED TRIAGE NOTES
Pt ambulated to triage. C/C rash on her left shoulder. Reports it started as a couple bumps on 1/29, but has gotten worse and spread. It is now red and raised.

## 2025-02-11 NOTE — DISCHARGE INSTRUCTIONS
First dose given in ED. Next dose of valtrex in 8 hours. Take every 8 hours.   Clean with soap and water. No alcohol or other topical applications

## 2025-02-11 NOTE — ED NOTES
Medication given prior to discharge.    Paperwork read with patient making note of where to  prescriptions (if applicable).     IV taken out (if applicable).     Armband removed and disposed of in shredder.     Patient has no further questions at this time.     Will discharge from system.

## 2025-05-02 PROCEDURE — 99284 EMERGENCY DEPT VISIT MOD MDM: CPT

## 2025-05-03 ENCOUNTER — HOSPITAL ENCOUNTER (EMERGENCY)
Facility: HOSPITAL | Age: 30
Discharge: HOME | End: 2025-05-03
Attending: EMERGENCY MEDICINE
Payer: MEDICARE

## 2025-05-03 ENCOUNTER — APPOINTMENT (OUTPATIENT)
Facility: HOSPITAL | Age: 30
End: 2025-05-03
Payer: MEDICARE

## 2025-05-03 VITALS
HEART RATE: 75 BPM | DIASTOLIC BLOOD PRESSURE: 70 MMHG | RESPIRATION RATE: 20 BRPM | TEMPERATURE: 98.2 F | OXYGEN SATURATION: 98 % | SYSTOLIC BLOOD PRESSURE: 129 MMHG

## 2025-05-03 DIAGNOSIS — K29.01 ACUTE SUPERFICIAL GASTRITIS WITH HEMORRHAGE: Primary | ICD-10-CM

## 2025-05-03 DIAGNOSIS — K92.0 HEMATEMESIS WITH NAUSEA: ICD-10-CM

## 2025-05-03 LAB
ALBUMIN SERPL-MCNC: 3.7 G/DL (ref 3.4–5)
ALBUMIN/GLOB SERPL: 1 (ref 0.8–1.7)
ALP SERPL-CCNC: 86 U/L (ref 45–117)
ALT SERPL-CCNC: 30 U/L (ref 10–35)
ANION GAP SERPL CALC-SCNC: 12 MMOL/L (ref 3–18)
AST SERPL-CCNC: 27 U/L (ref 10–38)
BASOPHILS # BLD: 0.03 K/UL (ref 0–0.1)
BASOPHILS NFR BLD: 0.4 % (ref 0–2)
BILIRUB SERPL-MCNC: 0.4 MG/DL (ref 0.2–1)
BUN SERPL-MCNC: 13 MG/DL (ref 6–23)
BUN/CREAT SERPL: 15 (ref 12–20)
CALCIUM SERPL-MCNC: 9.2 MG/DL (ref 8.5–10.1)
CHLORIDE SERPL-SCNC: 102 MMOL/L (ref 98–107)
CO2 SERPL-SCNC: 23 MMOL/L (ref 21–32)
CREAT SERPL-MCNC: 0.88 MG/DL (ref 0.6–1.3)
DIFFERENTIAL METHOD BLD: ABNORMAL
EOSINOPHIL # BLD: 0.28 K/UL (ref 0–0.4)
EOSINOPHIL NFR BLD: 3.7 % (ref 0–5)
ERYTHROCYTE [DISTWIDTH] IN BLOOD BY AUTOMATED COUNT: 15.3 % (ref 11.6–14.5)
GLOBULIN SER CALC-MCNC: 3.7 G/DL (ref 2–4)
GLUCOSE SERPL-MCNC: 93 MG/DL (ref 74–108)
HCG SERPL QL: NEGATIVE
HCT VFR BLD AUTO: 40.8 % (ref 35–45)
HGB BLD-MCNC: 13.4 G/DL (ref 12–16)
IMM GRANULOCYTES # BLD AUTO: 0.02 K/UL (ref 0–0.04)
IMM GRANULOCYTES NFR BLD AUTO: 0.3 % (ref 0–0.5)
LYMPHOCYTES # BLD: 4.17 K/UL (ref 0.9–3.3)
LYMPHOCYTES NFR BLD: 54.4 % (ref 21–52)
MAGNESIUM SERPL-MCNC: 2.1 MG/DL (ref 1.6–2.6)
MCH RBC QN AUTO: 28.5 PG (ref 24–34)
MCHC RBC AUTO-ENTMCNC: 32.8 G/DL (ref 31–37)
MCV RBC AUTO: 86.8 FL (ref 78–100)
MONOCYTES # BLD: 0.54 K/UL (ref 0.05–1.2)
MONOCYTES NFR BLD: 7 % (ref 3–10)
NEUTS SEG # BLD: 2.62 K/UL (ref 1.8–8)
NEUTS SEG NFR BLD: 34.2 % (ref 40–73)
NRBC # BLD: 0 K/UL (ref 0–0.01)
NRBC BLD-RTO: 0 PER 100 WBC
PLATELET # BLD AUTO: 282 K/UL (ref 135–420)
PMV BLD AUTO: 9.9 FL (ref 9.2–11.8)
POTASSIUM SERPL-SCNC: 3.8 MMOL/L (ref 3.5–5.5)
PROT SERPL-MCNC: 7.4 G/DL (ref 6.4–8.2)
RBC # BLD AUTO: 4.7 M/UL (ref 4.2–5.3)
SODIUM SERPL-SCNC: 137 MMOL/L (ref 136–145)
WBC # BLD AUTO: 7.7 K/UL (ref 4.6–13.2)

## 2025-05-03 PROCEDURE — 2580000003 HC RX 258: Performed by: EMERGENCY MEDICINE

## 2025-05-03 PROCEDURE — 6360000002 HC RX W HCPCS: Performed by: EMERGENCY MEDICINE

## 2025-05-03 PROCEDURE — 85025 COMPLETE CBC W/AUTO DIFF WBC: CPT

## 2025-05-03 PROCEDURE — 71046 X-RAY EXAM CHEST 2 VIEWS: CPT

## 2025-05-03 PROCEDURE — 80053 COMPREHEN METABOLIC PANEL: CPT

## 2025-05-03 PROCEDURE — 84703 CHORIONIC GONADOTROPIN ASSAY: CPT

## 2025-05-03 PROCEDURE — 96361 HYDRATE IV INFUSION ADD-ON: CPT

## 2025-05-03 PROCEDURE — 83735 ASSAY OF MAGNESIUM: CPT

## 2025-05-03 PROCEDURE — 96375 TX/PRO/DX INJ NEW DRUG ADDON: CPT

## 2025-05-03 PROCEDURE — 96374 THER/PROPH/DIAG INJ IV PUSH: CPT

## 2025-05-03 RX ORDER — OMEPRAZOLE 20 MG/1
20 CAPSULE, DELAYED RELEASE ORAL
Qty: 60 CAPSULE | Refills: 0 | Status: SHIPPED | OUTPATIENT
Start: 2025-05-03

## 2025-05-03 RX ORDER — ONDANSETRON 4 MG/1
4 TABLET, FILM COATED ORAL 3 TIMES DAILY PRN
Qty: 15 TABLET | Refills: 0 | Status: SHIPPED | OUTPATIENT
Start: 2025-05-03

## 2025-05-03 RX ORDER — 0.9 % SODIUM CHLORIDE 0.9 %
1000 INTRAVENOUS SOLUTION INTRAVENOUS ONCE
Status: COMPLETED | OUTPATIENT
Start: 2025-05-03 | End: 2025-05-03

## 2025-05-03 RX ORDER — ONDANSETRON 2 MG/ML
4 INJECTION INTRAMUSCULAR; INTRAVENOUS ONCE
Status: COMPLETED | OUTPATIENT
Start: 2025-05-03 | End: 2025-05-03

## 2025-05-03 RX ORDER — DICYCLOMINE HYDROCHLORIDE 10 MG/1
10 CAPSULE ORAL
Qty: 60 CAPSULE | Refills: 3 | Status: SHIPPED | OUTPATIENT
Start: 2025-05-03

## 2025-05-03 RX ADMIN — FAMOTIDINE 20 MG: 10 INJECTION, SOLUTION INTRAVENOUS at 00:39

## 2025-05-03 RX ADMIN — SODIUM CHLORIDE 1000 ML: 0.9 INJECTION, SOLUTION INTRAVENOUS at 00:38

## 2025-05-03 RX ADMIN — ONDANSETRON 4 MG: 2 INJECTION, SOLUTION INTRAMUSCULAR; INTRAVENOUS at 00:38

## 2025-05-03 NOTE — DISCHARGE INSTRUCTIONS
Do your best minimize alcohol tobacco hot spicy food.  These items will certainly aggravate gastritis.  Please see instructions.  Also avoid any prescription steroids by mouth in the next couple of weeks

## 2025-05-03 NOTE — ED TRIAGE NOTES
Patient arrived to the ED from home with complaints of vomiting blood x2 and some diarrhea. States symptoms started today.

## 2025-05-04 NOTE — ED PROVIDER NOTES
EMERGENCY DEPARTMENT HISTORY AND PHYSICAL EXAM    Date: 5/3/2025  Patient Name: Shreya Abraham    History of Presenting Illness     Chief Complaint   Patient presents with    Hematemesis         History Provided By: Patient    Additional History (Context):   11: 59 PM EDT  Shreya Abraham is a 29 y.o. female with PMHX of bipolar schizophrenia, PTSD/depression, tobacco abuse, alcohol use who presents to the emergency department C/O vomiting with blood.  Patient noticed she was nauseous earlier today.  She began having some spells of vomiting and when she did she noticed streaks of blood mixed in with it.  She has been prescribed a course of steroids for nonspecific rash.  She denies any blood per rectum.  She denies past history of malignancies.  She is not using any blood thinners.    Social History  She acknowledges tobacco use.  She did have some drinking in the last couple of days.  No illegal drug use.    Family History  Hypertension diabetes and cancer as listed below    PCP: None, None    No current facility-administered medications for this encounter.     Current Outpatient Medications   Medication Sig Dispense Refill    omeprazole (PRILOSEC) 20 MG delayed release capsule Take 1 capsule by mouth 2 times daily (before meals) 60 capsule 0    dicyclomine (BENTYL) 10 MG capsule Take 1 capsule by mouth 4 times daily (before meals and nightly) 60 capsule 3    ondansetron (ZOFRAN) 4 MG tablet Take 1 tablet by mouth 3 times daily as needed for Nausea or Vomiting 15 tablet 0    methylPREDNISolone (MEDROL DOSEPACK) 4 MG tablet Take with food. 1 kit 0       Past History     Past Medical History:  Past Medical History:   Diagnosis Date    Bipolar 1 disorder (HCC)     Bipolar 2 disorder (HCC) 8/3/2010    Borderline personality disorder (HCC)     Depression 8/3/2010    Major depression     Psychiatric disorder     Psychosis (HCC)     PTSD (post-traumatic stress disorder)     PTSD (post-traumatic stress disorder) 8/3/2010